# Patient Record
Sex: MALE | Race: WHITE | NOT HISPANIC OR LATINO | Employment: FULL TIME | URBAN - METROPOLITAN AREA
[De-identification: names, ages, dates, MRNs, and addresses within clinical notes are randomized per-mention and may not be internally consistent; named-entity substitution may affect disease eponyms.]

---

## 2017-03-05 ENCOUNTER — PATIENT MESSAGE (OUTPATIENT)
Dept: FAMILY MEDICINE | Facility: CLINIC | Age: 42
End: 2017-03-05

## 2017-03-05 DIAGNOSIS — G47.33 OSA (OBSTRUCTIVE SLEEP APNEA): Primary | ICD-10-CM

## 2017-04-04 ENCOUNTER — PATIENT MESSAGE (OUTPATIENT)
Dept: FAMILY MEDICINE | Facility: CLINIC | Age: 42
End: 2017-04-04

## 2017-04-20 ENCOUNTER — OFFICE VISIT (OUTPATIENT)
Dept: SLEEP MEDICINE | Facility: CLINIC | Age: 42
End: 2017-04-20
Payer: COMMERCIAL

## 2017-04-20 VITALS
HEART RATE: 80 BPM | BODY MASS INDEX: 34.7 KG/M2 | DIASTOLIC BLOOD PRESSURE: 64 MMHG | HEIGHT: 72 IN | SYSTOLIC BLOOD PRESSURE: 102 MMHG | WEIGHT: 256.19 LBS

## 2017-04-20 DIAGNOSIS — D50.9 IRON DEFICIENCY ANEMIA, UNSPECIFIED IRON DEFICIENCY ANEMIA TYPE: ICD-10-CM

## 2017-04-20 DIAGNOSIS — G47.30 UNSPECIFIED SLEEP APNEA: Primary | ICD-10-CM

## 2017-04-20 DIAGNOSIS — G25.81 RLS (RESTLESS LEGS SYNDROME): ICD-10-CM

## 2017-04-20 PROCEDURE — 1160F RVW MEDS BY RX/DR IN RCRD: CPT | Mod: S$GLB,,, | Performed by: NURSE PRACTITIONER

## 2017-04-20 PROCEDURE — 3074F SYST BP LT 130 MM HG: CPT | Mod: S$GLB,,, | Performed by: NURSE PRACTITIONER

## 2017-04-20 PROCEDURE — 99999 PR PBB SHADOW E&M-EST. PATIENT-LVL III: CPT | Mod: PBBFAC,,, | Performed by: NURSE PRACTITIONER

## 2017-04-20 PROCEDURE — 99204 OFFICE O/P NEW MOD 45 MIN: CPT | Mod: S$GLB,,, | Performed by: NURSE PRACTITIONER

## 2017-04-20 PROCEDURE — 3078F DIAST BP <80 MM HG: CPT | Mod: S$GLB,,, | Performed by: NURSE PRACTITIONER

## 2017-04-20 RX ORDER — ROPINIROLE 0.5 MG/1
.5-1 TABLET, FILM COATED ORAL NIGHTLY
Qty: 60 TABLET | Refills: 1 | Status: SHIPPED | OUTPATIENT
Start: 2017-04-20 | End: 2017-07-13 | Stop reason: SDUPTHER

## 2017-04-20 NOTE — PATIENT INSTRUCTIONS
Obstructive Sleep Apnea  Obstructive sleep apnea is a condition that causes your air passages to become narrowed or blocked during sleep. As a result, breathing stops for short periods. Your body wakes up enough for breathing to begin again, though you don't remember it. The cycle of stopped breathing and brief awakenings can repeat dozens of times a night. This prevents the body from getting to the deeper stages of sleep that are needed for good rest.  Signs of sleep apnea include loud snoring, noisy breathing, and gasping sounds during sleep. Daytime symptoms include waking up tired after a full night's sleep, waking up with headaches, feeling very sleepy or falling asleep during the day, and having problems with memory or concentration.  Risk factors for sleep apnea include:  · Being overweight  · Being a man, or a woman in menopause  · Smoking  · Using alcohol or sedating medications or herbs  · Having enlarged structures in the nose or throat  Home care  Lifestyle changes that can help treat snoring and sleep apnea include the following:  · If you are overweight, lose weight. Talk to your healthcare provider about a weight-loss plan for you.  · Avoid alcohol for 3 to 4 hours before bedtime. Avoid sedating medications. Ask your healthcare provider about the medications you take.  · If you smoke, talk to your healthcare provider about ways to quit.  · Sleep on your side. This can help prevent gravity from pulling relaxed throat tissues into your breathing passages.  · If you have allergies or sinus problems that block your nose, ask your healthcare provider for help.  Follow up  Follow up with your healthcare provider as advised. A diagnosis of sleep apnea is made with a sleep study. Your healthcare provider can tell you more about this test.  When to seek medical care  Sleep apnea can make you more likely to have certain health problems. These include high blood pressure, heart attack, stroke, and sexual  dysfunction. If you have sleep apnea, talk to your healthcare provider about the best treatments for you.  Date Last Reviewed: 5/3/2015  © 4151-0960 [employer]. [address] [place] [address], [place] [address]. All rights reserved. This information is not intended as a substitute for professional medical care. Always follow your healthcare professional's instructions.      Agatha or Khanh will contact you to schedule your sleep study. Their number is [phone] (ext 1). The [facility] is located on 7th floor of the [facility].    We will call you when the sleep study results are ready - if you have not heard from us by 2 weeks from the date of the study, please call [phone] (ext 2).    You are advised to abstain from driving should you feel sleepy or drowsy.    Understanding Restless Legs Syndrome  Are you ever annoyed by a creeping or itching feeling in your legs? Do you often feel an urge to move your legs while sitting or lying in bed? This can keep you from falling asleep at night. You may then feel tired during the day. If you have these problems, talk to your health care provider. He or she can suggest a treatment plan and help you find ways to sleep better.    Restless legs syndrome (RLS)  RLS is a creeping, crawly, or jumpy feeling in the legs with an urge to move them. Symptoms of RLS often occur during periods of inactivity, such as when you sit or lie down at night. This discomfort can keep you from falling asleep. RLS is more common in older people and tends to run in families. Overuse of caffeine or alcohol may make symptoms worse. Iron deficiency, diabetes, or kidney problems can contribute to RLS.  Periodic limb movement syndrome (PLMS)  PLMS is sudden, repetitive leg jerking during sleep. The person you sleep with is often the one who notices it. Your legs may jerk many times during the night. You and your partner may both have trouble sleeping and feel tired in the  morning. PLMS shouldnt be confused with the normal leg or body twitching many people have when first falling asleep.  Treating these problems  If these problems are causing disrupted sleep and daytime symptoms, treatment may be needed. Possible treatments may include:  · Avoiding medications like antidepressants, antinausea medications, and antipsychotic medications.   · Prescribed medications.  · Lifestyle changes, such as controlling caffeine intake, alcohol, and smoking.  Date Last Reviewed: 7/18/2015 © 2000-2016 [employer]. [address] [place] [address], [place] [address]. All rights reserved. This information is not intended as a substitute for professional medical care. Always follow your healthcare professional's instructions.        Understanding Restless Legs Syndrome  Are you ever annoyed by a creeping or itching feeling in your legs? Do you often feel an urge to move your legs while sitting or lying in bed? This can keep you from falling asleep at night. You may then feel tired during the day. If you have these problems, talk to your health care provider. He or she can suggest a treatment plan and help you find ways to sleep better.    Restless legs syndrome (RLS)  RLS is a creeping, crawly, or jumpy feeling in the legs with an urge to move them. Symptoms of RLS often occur during periods of inactivity, such as when you sit or lie down at night. This discomfort can keep you from falling asleep. RLS is more common in older people and tends to run in families. Overuse of caffeine or alcohol may make symptoms worse. Iron deficiency, diabetes, or kidney problems can contribute to RLS.  Periodic limb movement syndrome (PLMS)  PLMS is sudden, repetitive leg jerking during sleep. The person you sleep with is often the one who notices it. Your legs may jerk many times during the night. You and your partner may both have trouble sleeping and feel tired in the morning. PLMS shouldnt be confused with the  normal leg or body twitching many people have when first falling asleep.  Treating these problems  If these problems are causing disrupted sleep and daytime symptoms, treatment may be needed. Possible treatments may include:  · Avoiding medications like antidepressants, antinausea medications, and antipsychotic medications.   · Prescribed medications.  · Lifestyle changes, such as controlling caffeine intake, alcohol, and smoking.  Date Last Reviewed: 7/18/2015  © 3551-1020 [employer]. [address] [place] [address], [place] [address]. All rights reserved. This information is not intended as a substitute for professional medical care. Always follow your healthcare professional's instructions.

## 2017-04-20 NOTE — LETTER
April 20, 2017      Daysi Peoples MD  [address]  [place] [address]           [facility]  [address]  [facility] [address]  Phone: [phone]          Patient: Jaron Stein   MR Number: [id number]   YOB: 1975   Date of Visit: 4/20/2017       Dear Dr. Daysi Peoples:    Thank you for referring Jaron Stein to me for evaluation. Attached you will find relevant portions of my assessment and plan of care.    If you have questions, please do not hesitate to call me. I look forward to following Jaron Stein along with you.    Sincerely,    Carol Sotelo, NP    Enclosure  CC:  No Recipients    If you would like to receive this communication electronically, please contact [email or web address] or [phone] to request more information on [employer] access.    For providers and/or their staff who would like to refer a patient to [facility], please contact us through our one-stop-shop provider referral line, [facility], at [phone].    If you feel you have received this communication in error or would no longer like to receive these types of communications, please e-mail [email or web address]

## 2017-04-20 NOTE — PROGRESS NOTES
"Jaron Stein  was seen as a new patient, referred by Dr. Daysi Peoples , for the evaluation of obstructive sleep apnea.     CHIEF COMPLAINT: Snoring, excessive daytime sleepiness    HISTORY OF PRESENT ILLNESS: Jaron Stein a 42 y.o. male presents for the evaluation of obstructive sleep apnea. He has never had a sleep study.  He snores loudly, disurptive. "wife says its terrible". Sleeping separately. Wakes up tired. Don't feel good throughout the day. Can easily fall asleep when sedentary at work or when home in evening watching tv. +witnessed apneic pauses. Denies am headaches. Wakes up trying to catch his breath. Tries to sleep on his side. Irritable and butts due to poor sleep. Nocturia 0-1x.     Chronic allergies/claritin qd, allergy to some foods    When lies down has hard time falling asleep, knees ache, not painful, wants to keep moving legs or walk around house. Happens 6/7 nights, affecting sleep onset.     On todays [place] Sleepiness Scale the patient scores a 13/24.     Sleeps ~ 10p, WT 6a  Could be 1-3hr sleep onset    FAMILY HISTORY: No known sleep disorders.     SOCIAL HISTORY: . Social ETOH, no tobacco. Manager/desk work    REVIEW OF SYSTEMS:  Sleep related symptoms as per HPI; Positive overweight; Sinus congestion; Denies dyspnea; Denies palpitations; occasional acid reflux; Denies polyuria; Denies am headaches; + mood disturbance.  Otherwise, a balance of 10 systems reviewed is negative        PHYSICAL EXAM:   /64  Pulse 80  Ht 6' (1.829 m)  Wt 116.2 kg (256 lb 2.8 oz)  BMI 34.74 kg/m2  GENERAL: Obese body habitus, well groomed   HEENT: Conjunctivae are non-erythematous; Pupils equal, round, and reactive to light; Nose is symmetrical; Nasal mucosa is normal; Septum is midline; Inferior turbinates are normal; Nasal airflow is normal; Posterior pharynx is pink; Modified Mallampati: IV; Posterior palate is low; Tonsils 1+; Uvula is long/thick and pink;Tongue is high, broad " with prominent scalloped edges; Dentition is fair; No TMJ tenderness; Jaw opening and protrusion without click and without discomfort.   NECK: Supple. Neck circumference is 18 inches. No thyromegaly. No palpable nodes.   SKIN: On face and neck: No abrasions, no rashes, no lesions. No subcutaneous nodules are palpable.   RESPIRATORY: Chest is clear to auscultation. Normal chest expansion and non-labored breathing at rest.   CARDIOVASCULAR: Normal S1, S2. No murmurs, gallops or rubs. No carotid bruits bilaterally.   EXTREMITIES: No edema. No clubbing. No cyanosis. Station normal. Gait normal.   NEURO/PSYCH: Oriented to time, place and person. Normal attention span and concentration. Affect is full. Mood is normal.       ASSESSMENT:     Unspecified Sleep Apnea, with symptoms of disruptive snoring, witnessed apneic pauses, un-refreshing disrupted sleep and excessive daytime sleepiness, with exam findings of a crowded oral airway, with medical comorbidities of obesity, hypertension ,anxiety. Warrants further investigation for untreated sleep apnea.     RLS    PLAN:   1.  Home Sleep Study, discussed plan of care. Myochnser results  2. Discussed etiology of LISA and potential ramifications of untreated LISA, including stroke, heart disease, HTN.  We discussed potential treatment options, which could include weight loss (10-15%), body positioning, continuous positive airway pressure (CPAP-definitive), mandibular advancement splint by dentist, or referral for surgical consideration.   3. The patient was advised to abstain from driving should he feel sleepy or drowsy.   4. Consider breathe right strips, upright sleep, begin sinus flush qhs to improve nasal patency  5. Discussed etiology and CHUCKIE as treatable cause of RLS, and other reasons for periodic limb movts during sleep. Check ferritin- goal ferritin >50. Begin low dose Requip 0.25mg with up-titration q 3-5 nights, goal if needed 1mg for improvement of symptoms. Discussed  purpose and s/e    Thank you for allowing me the opportunity to participate in the care of your patient

## 2017-04-20 NOTE — MR AVS SNAPSHOT
[facility]  [address]  [facility] [address]  Phone: [phone]                  Jaron Stein   2017 10:20 AM   Office Visit    Description:  Male : 1975   Provider:  Carol Sotelo NP   Department:  [facility]           Reason for Visit     Snoring           Diagnoses this Visit        Comments    Unspecified sleep apnea    -  Primary     Iron deficiency anemia, unspecified iron deficiency anemia type         RLS (restless legs syndrome)                To Do List           Future Appointments        Provider Department Dept Phone    2017 7:00 AM TITA MARRERO [facility] [phone]      Goals (5 Years of Data)     None       These Medications        Disp Refills Start End    ropinirole (REQUIP) 0.5 MG tablet 60 tablet 1 2017    Take 1-2 tablets (0.5-1 mg total) by mouth every evening. - Oral    Pharmacy: [facility] [address] [place] [address] EXPY AT [facility] #: [phone]         [facility]     [facility] Nurse Care Line -  Assistance  Unless otherwise directed by your provider, please contact [facility], our nurse care line that is available for  assistance.     Registered nurses in the [facility] provide: appointment scheduling, clinical advisement, health education, and other advisory services.  Call: [phone] (toll free)               Medications           Message regarding Medications     Verify the changes and/or additions to your medication regime listed below are the same as discussed with your clinician today.  If any of these changes or additions are incorrect, please notify your healthcare provider.        START taking these NEW medications        Refills    ropinirole (REQUIP) 0.5 MG tablet 1    Sig: Take 1-2 tablets (0.5-1 mg total) by mouth every evening.    Class: Normal    Route: Oral            Verify that the below list of medications is an accurate representation of the medications you are currently taking.  If none reported, the list may be blank. If incorrect, please contact your healthcare provider. Carry this list with you in case of emergency.           Current Medications     nadolol (CORGARD) 40 MG tablet Take 1 tablet by mouth once daily    paroxetine (PAXIL) 10 MG tablet Take 1 tablet by mouth  every morning    ropinirole (REQUIP) 0.5 MG tablet Take 1-2 tablets (0.5-1 mg total) by mouth every evening.           Clinical Reference Information           Your Vitals Were     BP Pulse Height Weight BMI    102/64 80 6' (1.829 m) 116.2 kg (256 lb 2.8 oz) 34.74 kg/m2      Blood Pressure          Most Recent Value    BP  102/64      Allergies as of 4/20/2017     No Known Allergies      Immunizations Administered on Date of Encounter - 4/20/2017     None      Orders Placed During Today's Visit     Future Labs/Procedures Expected by Expires    Ferritin  4/20/2017 6/19/2018    Home Sleep Studies  As directed 4/20/2018      Instructions      Obstructive Sleep Apnea  Obstructive sleep apnea is a condition that causes your air passages to become narrowed or blocked during sleep. As a result, breathing stops for short periods. Your body wakes up enough for breathing to begin again, though you don't remember it. The cycle of stopped breathing and brief awakenings can repeat dozens of times a night. This prevents the body from getting to the deeper stages of sleep that are needed for good rest.  Signs of sleep apnea include loud snoring, noisy breathing, and gasping sounds during sleep. Daytime symptoms include waking up tired after a full night's sleep, waking up with headaches, feeling very sleepy or falling asleep during the day, and having problems with memory or concentration.  Risk factors for sleep apnea include:  · Being overweight  · Being a man, or a woman in menopause  · Smoking  · Using alcohol or  sedating medications or herbs  · Having enlarged structures in the nose or throat  Home care  Lifestyle changes that can help treat snoring and sleep apnea include the following:  · If you are overweight, lose weight. Talk to your healthcare provider about a weight-loss plan for you.  · Avoid alcohol for 3 to 4 hours before bedtime. Avoid sedating medications. Ask your healthcare provider about the medications you take.  · If you smoke, talk to your healthcare provider about ways to quit.  · Sleep on your side. This can help prevent gravity from pulling relaxed throat tissues into your breathing passages.  · If you have allergies or sinus problems that block your nose, ask your healthcare provider for help.  Follow up  Follow up with your healthcare provider as advised. A diagnosis of sleep apnea is made with a sleep study. Your healthcare provider can tell you more about this test.  When to seek medical care  Sleep apnea can make you more likely to have certain health problems. These include high blood pressure, heart attack, stroke, and sexual dysfunction. If you have sleep apnea, talk to your healthcare provider about the best treatments for you.  Date Last Reviewed: 5/3/2015  © 5087-8605 [employer]. [address] [place] [address]. All rights reserved. This information is not intended as a substitute for professional medical care. Always follow your healthcare professional's instructions.      Agatha or Khanh will contact you to schedule your sleep study. Their number is [phone] (ext 1). The [facility] is located on 7th floor of the [facility].    We will call you when the sleep study results are ready - if you have not heard from us by 2 weeks from the date of the study, please call [phone] (ext 2).    You are advised to abstain from driving should you feel sleepy or drowsy.    Understanding Restless Legs Syndrome  Are you ever annoyed by a creeping or itching  feeling in your legs? Do you often feel an urge to move your legs while sitting or lying in bed? This can keep you from falling asleep at night. You may then feel tired during the day. If you have these problems, talk to your health care provider. He or she can suggest a treatment plan and help you find ways to sleep better.    Restless legs syndrome (RLS)  RLS is a creeping, crawly, or jumpy feeling in the legs with an urge to move them. Symptoms of RLS often occur during periods of inactivity, such as when you sit or lie down at night. This discomfort can keep you from falling asleep. RLS is more common in older people and tends to run in families. Overuse of caffeine or alcohol may make symptoms worse. Iron deficiency, diabetes, or kidney problems can contribute to RLS.  Periodic limb movement syndrome (PLMS)  PLMS is sudden, repetitive leg jerking during sleep. The person you sleep with is often the one who notices it. Your legs may jerk many times during the night. You and your partner may both have trouble sleeping and feel tired in the morning. PLMS shouldnt be confused with the normal leg or body twitching many people have when first falling asleep.  Treating these problems  If these problems are causing disrupted sleep and daytime symptoms, treatment may be needed. Possible treatments may include:  · Avoiding medications like antidepressants, antinausea medications, and antipsychotic medications.   · Prescribed medications.  · Lifestyle changes, such as controlling caffeine intake, alcohol, and smoking.  Date Last Reviewed: 7/18/2015  © 1988-3766 [employer]. [address] [place] [address], [place] [address]. All rights reserved. This information is not intended as a substitute for professional medical care. Always follow your healthcare professional's instructions.        Understanding Restless Legs Syndrome  Are you ever annoyed by a creeping or itching feeling in your legs? Do you often feel an  urge to move your legs while sitting or lying in bed? This can keep you from falling asleep at night. You may then feel tired during the day. If you have these problems, talk to your health care provider. He or she can suggest a treatment plan and help you find ways to sleep better.    Restless legs syndrome (RLS)  RLS is a creeping, crawly, or jumpy feeling in the legs with an urge to move them. Symptoms of RLS often occur during periods of inactivity, such as when you sit or lie down at night. This discomfort can keep you from falling asleep. RLS is more common in older people and tends to run in families. Overuse of caffeine or alcohol may make symptoms worse. Iron deficiency, diabetes, or kidney problems can contribute to RLS.  Periodic limb movement syndrome (PLMS)  PLMS is sudden, repetitive leg jerking during sleep. The person you sleep with is often the one who notices it. Your legs may jerk many times during the night. You and your partner may both have trouble sleeping and feel tired in the morning. PLMS shouldnt be confused with the normal leg or body twitching many people have when first falling asleep.  Treating these problems  If these problems are causing disrupted sleep and daytime symptoms, treatment may be needed. Possible treatments may include:  · Avoiding medications like antidepressants, antinausea medications, and antipsychotic medications.   · Prescribed medications.  · Lifestyle changes, such as controlling caffeine intake, alcohol, and smoking.  Date Last Reviewed: 7/18/2015 © 2000-2016 [employer]. [address] [place] [address], [place] [address]. All rights reserved. This information is not intended as a substitute for professional medical care. Always follow your healthcare professional's instructions.             Language Assistance Services     ATTENTION: Language assistance services are available, free of charge. Please call [phone].      ATENCIÓN: Misty purvis  tiene a akhtar disposición servicios gratuitos de asistencia lingüística. Llneno al [phone].     RANJIT Ý: N?u b?n nói Ti?ng Vi?t, có các d?ch v? h? tr? ngôn ng? mi?n phí dành cho b?n. G?i s? [phone].         [facility] complies with applicable Federal civil rights laws and does not discriminate on the basis of race, color, national origin, age, disability, or sex.

## 2017-04-21 ENCOUNTER — LAB VISIT (OUTPATIENT)
Dept: LAB | Facility: HOSPITAL | Age: 42
End: 2017-04-21
Attending: NURSE PRACTITIONER
Payer: COMMERCIAL

## 2017-04-21 DIAGNOSIS — D50.9 IRON DEFICIENCY ANEMIA, UNSPECIFIED IRON DEFICIENCY ANEMIA TYPE: ICD-10-CM

## 2017-04-21 LAB — FERRITIN SERPL-MCNC: 160 NG/ML

## 2017-04-21 PROCEDURE — 36415 COLL VENOUS BLD VENIPUNCTURE: CPT | Mod: PO

## 2017-04-21 PROCEDURE — 82728 ASSAY OF FERRITIN: CPT

## 2017-05-01 ENCOUNTER — TELEPHONE (OUTPATIENT)
Dept: SLEEP MEDICINE | Facility: OTHER | Age: 42
End: 2017-05-01

## 2017-05-04 ENCOUNTER — TELEPHONE (OUTPATIENT)
Dept: SLEEP MEDICINE | Facility: OTHER | Age: 42
End: 2017-05-04

## 2017-05-15 ENCOUNTER — LAB VISIT (OUTPATIENT)
Dept: LAB | Facility: HOSPITAL | Age: 42
End: 2017-05-15
Attending: NURSE PRACTITIONER
Payer: COMMERCIAL

## 2017-05-15 ENCOUNTER — OFFICE VISIT (OUTPATIENT)
Dept: FAMILY MEDICINE | Facility: CLINIC | Age: 42
End: 2017-05-15
Payer: COMMERCIAL

## 2017-05-15 VITALS
OXYGEN SATURATION: 98 % | HEART RATE: 76 BPM | BODY MASS INDEX: 34.78 KG/M2 | SYSTOLIC BLOOD PRESSURE: 114 MMHG | HEIGHT: 72 IN | WEIGHT: 256.81 LBS | TEMPERATURE: 99 F | DIASTOLIC BLOOD PRESSURE: 78 MMHG

## 2017-05-15 DIAGNOSIS — Z00.00 ROUTINE MEDICAL EXAM: ICD-10-CM

## 2017-05-15 DIAGNOSIS — R09.82 PND (POST-NASAL DRIP): ICD-10-CM

## 2017-05-15 DIAGNOSIS — Z83.3 FAMILY HISTORY OF DIABETES MELLITUS IN MOTHER: ICD-10-CM

## 2017-05-15 DIAGNOSIS — Z00.00 ROUTINE MEDICAL EXAM: Primary | ICD-10-CM

## 2017-05-15 DIAGNOSIS — I10 ESSENTIAL HYPERTENSION: ICD-10-CM

## 2017-05-15 LAB
ALBUMIN SERPL BCP-MCNC: 4.1 G/DL
ALP SERPL-CCNC: 51 U/L
ALT SERPL W/O P-5'-P-CCNC: 45 U/L
ANION GAP SERPL CALC-SCNC: 14 MMOL/L
AST SERPL-CCNC: 20 U/L
BILIRUB SERPL-MCNC: 0.6 MG/DL
BUN SERPL-MCNC: 16 MG/DL
CALCIUM SERPL-MCNC: 9.5 MG/DL
CHLORIDE SERPL-SCNC: 107 MMOL/L
CHOLEST/HDLC SERPL: 6.3 {RATIO}
CO2 SERPL-SCNC: 19 MMOL/L
CREAT SERPL-MCNC: 1.1 MG/DL
EST. GFR  (AFRICAN AMERICAN): >60 ML/MIN/1.73 M^2
EST. GFR  (NON AFRICAN AMERICAN): >60 ML/MIN/1.73 M^2
GLUCOSE SERPL-MCNC: 96 MG/DL
HDL/CHOLESTEROL RATIO: 16 %
HDLC SERPL-MCNC: 225 MG/DL
HDLC SERPL-MCNC: 36 MG/DL
LDLC SERPL CALC-MCNC: 150.6 MG/DL
NONHDLC SERPL-MCNC: 189 MG/DL
POTASSIUM SERPL-SCNC: 5 MMOL/L
PROT SERPL-MCNC: 7.4 G/DL
SODIUM SERPL-SCNC: 140 MMOL/L
TRIGL SERPL-MCNC: 192 MG/DL
TSH SERPL DL<=0.005 MIU/L-ACNC: 0.84 UIU/ML

## 2017-05-15 PROCEDURE — 80061 LIPID PANEL: CPT

## 2017-05-15 PROCEDURE — 84443 ASSAY THYROID STIM HORMONE: CPT

## 2017-05-15 PROCEDURE — 99396 PREV VISIT EST AGE 40-64: CPT | Mod: S$GLB,,, | Performed by: NURSE PRACTITIONER

## 2017-05-15 PROCEDURE — 1160F RVW MEDS BY RX/DR IN RCRD: CPT | Mod: S$GLB,,, | Performed by: NURSE PRACTITIONER

## 2017-05-15 PROCEDURE — 3078F DIAST BP <80 MM HG: CPT | Mod: S$GLB,,, | Performed by: NURSE PRACTITIONER

## 2017-05-15 PROCEDURE — 83036 HEMOGLOBIN GLYCOSYLATED A1C: CPT

## 2017-05-15 PROCEDURE — 99999 PR PBB SHADOW E&M-EST. PATIENT-LVL III: CPT | Mod: PBBFAC,,, | Performed by: NURSE PRACTITIONER

## 2017-05-15 PROCEDURE — 3074F SYST BP LT 130 MM HG: CPT | Mod: S$GLB,,, | Performed by: NURSE PRACTITIONER

## 2017-05-15 PROCEDURE — 36415 COLL VENOUS BLD VENIPUNCTURE: CPT | Mod: PO

## 2017-05-15 PROCEDURE — 99213 OFFICE O/P EST LOW 20 MIN: CPT | Mod: 25,S$GLB,, | Performed by: NURSE PRACTITIONER

## 2017-05-15 PROCEDURE — 80053 COMPREHEN METABOLIC PANEL: CPT

## 2017-05-15 NOTE — PROGRESS NOTES
This dictation has been generated using Dragon Dictation some phonetic errors may occur.     Jaron was seen today for sore throat.    Diagnoses and all orders for this visit:    Routine medical exam  -     Comprehensive metabolic panel; Future  -     Hemoglobin A1c; Future  -     Lipid panel; Future  -     TSH; Future    PND (post-nasal drip)    Essential hypertension    Family history of diabetes mellitus in mother  -     Hemoglobin A1c; Future      Physical exam. Update labs. I will review and address accordingly.   PND. Claritin(loratadine), Allegra(fexofenadine), or zyrtec(cetirizine) for runny nose and congestion.   HTN stable and controlled continue current therapies.       No Follow-up on file.      ________________________________________________________________  ________________________________________________________________        Chief Complaint   Patient presents with    Sore Throat     History of present illness  This 42 y.o. presents today for complaint of physical, PND, and HTN follow up.  Pt made apt for sore throat but is due for physical and follow up on Blood pressure. He remains active. Taking BP med as directed without side effects.  Health maintenance up to date.   ROS:   CONST: weight stable.  EYES: no vision change.  ENT: Sore throat and throat clearing.  No earache.  CV: no chest pain w/ exertion.  RESP: No shortness of breath.  GI: no nausea, vomiting, diarrhea. No dysphagia. Appetite good.   : no urinary issues.  MUSCULOSKELETAL: no new myalgias or arthralgias.  SKIN: no new changes.  NEURO: no focal deficits. Denies headache.  PSYCH: no new issues.  ENDOCRINE: no polyuria.  HEME: no lymph nodes.  ALLERGY: no general pruritis.      Reviewed histories.     Past Medical History:   Diagnosis Date    Anxiety     Headache, migraine     Hypertension     Obesity        Past Surgical History:   Procedure Laterality Date    NO PAST SURGERIES         Family History   Problem Relation Age of  Onset    Diabetes Mother        Social History     Social History    Marital status:      Spouse name: N/A    Number of children: N/A    Years of education: N/A     Social History Main Topics    Smoking status: Former Smoker    Smokeless tobacco: Never Used      Comment: quit 6-7 years ago    Alcohol use Yes      Comment: socially    Drug use: No    Sexual activity: Yes     Partners: Female     Other Topics Concern    None     Social History Narrative       Current Outpatient Prescriptions   Medication Sig Dispense Refill    nadolol (CORGARD) 40 MG tablet Take 1 tablet by mouth once daily 90 tablet 3    paroxetine (PAXIL) 10 MG tablet Take 1 tablet by mouth  every morning 90 tablet 3    ropinirole (REQUIP) 0.5 MG tablet Take 1-2 tablets (0.5-1 mg total) by mouth every evening. 60 tablet 1     No current facility-administered medications for this visit.        Review of patient's allergies indicates:  No Known Allergies    Physical examination  Vitals Reviewed  Gen. Well-dressed well-nourished no apparent distress  Skin warm dry and intact.  No rashes noted.  HEENT.  TM intact bilateral with normal light reflex.  No mastoid tenderness during percussion.  Nares patent bilateral.  Pharynx is unremarkable except PND.  No maxillary or frontal sinus tenderness when percussed.    Neck is supple without adenopathy  Chest.  Respirations are even unlabored.  Lungs are clear to auscultation.  Cardiac regular rate and rhythm.  No chest wall adenopathy noted.  Neuro. Awake alert oriented x4.  Normal judgment and cognition noted.  Extremities no clubbing cyanosis or edema noted.     Call or return to clinic prn if these symptoms worsen or fail to improve as anticipated.      _____________________________________________________________________________________________________________________________________________    Chief complaint  Blood pressure  Sore throat    HPI  Pt has had a sore throat for 2 weeks.  He  notes throat clearing. Symptoms are worse in the AM. Eating or drinking sooths throat.  No med for symptoms.  Rates pain as a 4 on a 1/10 scale.   ROS:   CONST: weight stable. No fever or chills.  EYES: no vision change.  ENT: No sore throat, headache, or earache.  CV: no chest pain w/ exertion.  RESP: No shortness of breath.  No coughing.  GI: no nausea, vomiting, diarrhea. No dysphagia. No GERD.   : no urinary issues.  MUSCULOSKELETAL: no new myalgias or arthralgias.  SKIN: no new changes. No rash.  NEURO: no focal deficits. Denies headache.  PSYCH: no new issues.  ENDOCRINE: no polyuria.  HEME: no lymph nodes.  ALLERGY: no general pruritis.      Reviewed histories.    Physical exam as above.    PND (post-nasal drip)    Essential hypertension    Family history of diabetes mellitus in mother  -     Hemoglobin A1c; Future       Claritin(loratadine), Allegra(fexofenadine), or zyrtec(cetirizine) for runny nose and congestion.     Continue HTN meds. No changes.

## 2017-05-15 NOTE — MR AVS SNAPSHOT
[facility]  [address] [place]  [place] [address]  Phone: [phone]  Fax: [phone]                  Jaron Stein   5/15/2017 8:20 AM   Office Visit    Description:  Male : 1975   Provider:  Moris Blackwell NP   Department:  [facility]           Reason for Visit     Sore Throat           Diagnoses this Visit        Comments    Routine medical exam    -  Primary     PND (post-nasal drip)         Essential hypertension         Family history of diabetes mellitus in mother                To Do List           Future Appointments        Provider Department Dept Phone    5/15/2017 8:20 AM Moris Blackwell NP [facility] [phone]    2017 1:00 PM HOME STUDY, SLEEP [facility] [phone]      Goals (5 Years of Data)     None      [facility] On Call     [facility] Nurse Care Line - 24/ Assistance  Unless otherwise directed by your provider, please contact [facility], our nurse care line that is available for  assistance.     Registered nurses in the [facility] Center provide: appointment scheduling, clinical advisement, health education, and other advisory services.  Call: [phone] (toll free)               Medications           Message regarding Medications     Verify the changes and/or additions to your medication regime listed below are the same as discussed with your clinician today.  If any of these changes or additions are incorrect, please notify your healthcare provider.             Verify that the below list of medications is an accurate representation of the medications you are currently taking.  If none reported, the list may be blank. If incorrect, please contact your healthcare provider. Carry this list with you in case of emergency.           Current Medications     nadolol (CORGARD) 40 MG tablet Take 1 tablet by mouth once daily    paroxetine (PAXIL) 10 MG tablet Take 1 tablet by mouth   every morning    ropinirole (REQUIP) 0.5 MG tablet Take 1-2 tablets (0.5-1 mg total) by mouth every evening.           Clinical Reference Information           Your Vitals Were     BP Pulse Temp Height Weight SpO2    114/78 (BP Location: Right arm, Patient Position: Sitting, BP Method: Manual) 76 98.8 °F (37.1 °C) 6' (1.829 m) 116.5 kg (256 lb 13.4 oz) 98%    BMI                34.83 kg/m2          Blood Pressure          Most Recent Value    BP  114/78      Allergies as of 5/15/2017     No Known Allergies      Immunizations Administered on Date of Encounter - 5/15/2017     None      Orders Placed During Today's Visit     Future Labs/Procedures Expected by Expires    Comprehensive metabolic panel  5/15/2017 8/13/2017    Lipid panel  5/15/2017 8/13/2017    TSH  5/15/2017 8/13/2017    Hemoglobin A1c  As directed 5/15/2018      Instructions    Claritin(loratadine), Allegra(fexofenadine), or zyrtec(cetirizine) for runny nose and congestion.   Delsym for cough.          Language Assistance Services     ATTENTION: Language assistance services are available, free of charge. Please call [phone].      ATENCIÓN: Si cesar purvis, tiene a akhtar disposición servicios gratuitos de asistencia lingüística. Llame al [phone].     RANJIT Ý: N?u b?n nói Ti?ng Vi?t, có các d?ch v? h? tr? ngôn ng? mi?n phí dành cho b?n. G?i s? [phone].         [facility] complies with applicable Federal civil rights laws and does not discriminate on the basis of race, color, national origin, age, disability, or sex.

## 2017-05-16 LAB
ESTIMATED AVG GLUCOSE: 105 MG/DL
HBA1C MFR BLD HPLC: 5.3 %

## 2017-06-05 ENCOUNTER — TELEPHONE (OUTPATIENT)
Dept: SLEEP MEDICINE | Facility: OTHER | Age: 42
End: 2017-06-05

## 2017-06-06 ENCOUNTER — HOSPITAL ENCOUNTER (OUTPATIENT)
Dept: SLEEP MEDICINE | Facility: OTHER | Age: 42
Discharge: HOME | End: 2017-06-06
Attending: NURSE PRACTITIONER
Payer: COMMERCIAL

## 2017-06-06 DIAGNOSIS — G47.33 OSA (OBSTRUCTIVE SLEEP APNEA): ICD-10-CM

## 2017-06-06 DIAGNOSIS — G47.30 UNSPECIFIED SLEEP APNEA: ICD-10-CM

## 2017-06-06 PROCEDURE — 95800 SLP STDY UNATTENDED: CPT

## 2017-06-06 PROCEDURE — 95800 SLP STDY UNATTENDED: CPT | Mod: 26,,, | Performed by: PSYCHIATRY & NEUROLOGY

## 2017-06-11 DIAGNOSIS — I10 ESSENTIAL HYPERTENSION: ICD-10-CM

## 2017-06-11 DIAGNOSIS — F41.9 ANXIETY: ICD-10-CM

## 2017-06-12 ENCOUNTER — PATIENT MESSAGE (OUTPATIENT)
Dept: FAMILY MEDICINE | Facility: CLINIC | Age: 42
End: 2017-06-12

## 2017-06-12 DIAGNOSIS — Z30.09 VASECTOMY EVALUATION: Primary | ICD-10-CM

## 2017-06-12 RX ORDER — NADOLOL 40 MG/1
TABLET ORAL
Qty: 90 TABLET | OUTPATIENT
Start: 2017-06-12

## 2017-06-12 RX ORDER — PAROXETINE 10 MG/1
TABLET, FILM COATED ORAL
Qty: 90 TABLET | OUTPATIENT
Start: 2017-06-12

## 2017-06-18 ENCOUNTER — PATIENT MESSAGE (OUTPATIENT)
Dept: SLEEP MEDICINE | Facility: CLINIC | Age: 42
End: 2017-06-18

## 2017-06-30 ENCOUNTER — PATIENT MESSAGE (OUTPATIENT)
Dept: FAMILY MEDICINE | Facility: CLINIC | Age: 42
End: 2017-06-30

## 2017-06-30 DIAGNOSIS — R09.81 SINUS CONGESTION: Primary | ICD-10-CM

## 2017-06-30 DIAGNOSIS — G47.33 OBSTRUCTIVE SLEEP APNEA: Primary | ICD-10-CM

## 2017-07-12 ENCOUNTER — PATIENT MESSAGE (OUTPATIENT)
Dept: FAMILY MEDICINE | Facility: CLINIC | Age: 42
End: 2017-07-12

## 2017-07-13 DIAGNOSIS — G25.81 RLS (RESTLESS LEGS SYNDROME): ICD-10-CM

## 2017-07-13 RX ORDER — ROPINIROLE 0.5 MG/1
TABLET, FILM COATED ORAL
Qty: 60 TABLET | Refills: 5 | Status: SHIPPED | OUTPATIENT
Start: 2017-07-13 | End: 2019-01-12

## 2017-07-13 NOTE — TELEPHONE ENCOUNTER
Dr Peoples put this referral in, I do not schedule her referrals. [place] does, but I called the patient and he is scheduled.

## 2017-07-21 ENCOUNTER — OFFICE VISIT (OUTPATIENT)
Dept: UROLOGY | Facility: CLINIC | Age: 42
End: 2017-07-21
Payer: COMMERCIAL

## 2017-07-21 VITALS
WEIGHT: 264.13 LBS | SYSTOLIC BLOOD PRESSURE: 118 MMHG | DIASTOLIC BLOOD PRESSURE: 78 MMHG | BODY MASS INDEX: 35.78 KG/M2 | HEART RATE: 60 BPM | HEIGHT: 72 IN

## 2017-07-21 DIAGNOSIS — Z30.09 STERILIZATION CONSULT: Primary | ICD-10-CM

## 2017-07-21 LAB
BILIRUB SERPL-MCNC: NORMAL MG/DL
BLOOD URINE, POC: NORMAL
COLOR, POC UA: YELLOW
GLUCOSE UR QL STRIP: NORMAL
KETONES UR QL STRIP: NORMAL
LEUKOCYTE ESTERASE URINE, POC: NORMAL
NITRITE, POC UA: NORMAL
PH, POC UA: 5
PROTEIN, POC: NORMAL
SPECIFIC GRAVITY, POC UA: 1030
UROBILINOGEN, POC UA: NORMAL

## 2017-07-21 PROCEDURE — 99243 OFF/OP CNSLTJ NEW/EST LOW 30: CPT | Mod: 25,S$GLB,, | Performed by: UROLOGY

## 2017-07-21 PROCEDURE — 81001 URINALYSIS AUTO W/SCOPE: CPT | Mod: S$GLB,,, | Performed by: UROLOGY

## 2017-07-21 PROCEDURE — 99999 PR PBB SHADOW E&M-EST. PATIENT-LVL III: CPT | Mod: PBBFAC,,, | Performed by: UROLOGY

## 2017-07-21 RX ORDER — DIAZEPAM 10 MG/1
10 TABLET ORAL ONCE AS NEEDED
Qty: 1 TABLET | Refills: 0 | Status: SHIPPED | OUTPATIENT
Start: 2017-07-21 | End: 2017-09-01

## 2017-07-21 NOTE — LETTER
July 24, 2017      Daysi Peoples MD  [address], [place] [address]           [facility]  [address] [facility] [address]  [facility] [address]  Phone: [phone]          Patient: Jaron Stein   MR Number: [id number]   YOB: 1975   Date of Visit: 7/21/2017       Dear Dr. Daysi Peoples:    Thank you for referring Jaron Stein to me for evaluation. Attached you will find relevant portions of my assessment and plan of care.    If you have questions, please do not hesitate to call me. I look forward to following Jaron Stein along with you.    Sincerely,        Enclosure  CC:  No Recipients    If you would like to receive this communication electronically, please contact [email or web address] or [phone] to request more information on [email or web address] Link access.    For providers and/or their staff who would like to refer a patient to [facility], please contact us through our one-stop-shop provider referral line, Johnie Carey, at [phone].    If you feel you have received this communication in error or would no longer like to receive these types of communications, please e-mail [email or web address]

## 2017-07-21 NOTE — PROGRESS NOTES
Subjective:       Patient ID: Jaron Stein is a 42 y.o. male who was referred by Daysi Peoples MD    Chief Complaint:   Chief Complaint   Patient presents with    Sterilization     consult for sterilzation        Vasectomy Consult  Patient here for pre-vasectomy consultation. He denies hematuria, urinary tract infections, urolithiasis, prostatitis, erectile dysfunction, previous genitourinary surgery, epididymal orchitis, testicular masses, scrotal trauma, sexually transmitted diseases, family history of prostate cancer. He was given the consent form, pre-vasectomy instruction sheet, and vasectomy booklet. I extensively reviewed with him the likely postoperative recuperative period as well as the need to continue to use contraception until he is notified by us of his sterility. He will have a semen analysis after 25 ejaculations. He understands the potential side effects of anesthesia, bleeding, scrotal hematoma, wound infection, epididymal orchitis, epididymal congestion, chronic testicular pain requiring further surgery, sperm granuloma, antisperm antibodies, early recanalization, spontaneous recanalization with pregnancy after demonstration of azoospermia and the possible association with prostate cancer. He is aware of alternatives to vasectomy. He has given this careful consideration and wishes to proceed with a vasectomy.   He has three children.        ACTIVE MEDICAL ISSUES:  Patient Active Problem List   Diagnosis    Hypertension    Obesity    Unspecified sleep apnea    LISA (obstructive sleep apnea)       PAST MEDICAL HISTORY  Past Medical History:   Diagnosis Date    Anxiety     Headache, migraine     Hypertension     Obesity        PAST SURGICAL HISTORY:  Past Surgical History:   Procedure Laterality Date    NO PAST SURGERIES         SOCIAL HISTORY:  Social History   Substance Use Topics    Smoking status: Former Smoker    Smokeless tobacco: Never Used      Comment: quit 6-7 years ago     Alcohol use Yes      Comment: socially       FAMILY HISTORY:  Family History   Problem Relation Age of Onset    Diabetes Mother        ALLERGIES AND MEDICATIONS: updated and reviewed.  Review of patient's allergies indicates:  No Known Allergies  Current Outpatient Prescriptions   Medication Sig    nadolol (CORGARD) 40 MG tablet Take 1 tablet by mouth once daily    paroxetine (PAXIL) 10 MG tablet Take 1 tablet by mouth  every morning    ropinirole (REQUIP) 0.5 MG tablet TAKE 1 TO 2 TABLETS(0.5 TO 1 MG) BY MOUTH EVERY EVENING    diazePAM (VALIUM) 10 MG Tab Take 1 tablet (10 mg total) by mouth once as needed.     No current facility-administered medications for this visit.        Review of Systems   Constitutional: Negative for activity change, fatigue, fever and unexpected weight change.   HENT: Negative for congestion.    Eyes: Negative for redness.   Respiratory: Negative for chest tightness and shortness of breath.    Cardiovascular: Negative for chest pain and leg swelling.   Gastrointestinal: Negative for abdominal pain, constipation, diarrhea, nausea and vomiting.   Genitourinary: Negative for dysuria, flank pain, frequency, hematuria, penile pain, penile swelling, scrotal swelling, testicular pain and urgency.   Musculoskeletal: Negative for arthralgias and back pain.   Neurological: Negative for dizziness and light-headedness.   Psychiatric/Behavioral: Negative for behavioral problems and confusion. The patient is not nervous/anxious.    All other systems reviewed and are negative.      Objective:      Vitals:    07/21/17 1349   BP: 118/78   Pulse: 60   Weight: 119.8 kg (264 lb 1.8 oz)   Height: 6' (1.829 m)     Physical Exam   Nursing note and vitals reviewed.  Constitutional: He is oriented to person, place, and time. He appears well-developed and well-nourished.   HENT:   Head: Normocephalic.   Eyes: Conjunctivae are normal.   Neck: Normal range of motion. Neck supple. No tracheal deviation  present. No thyromegaly present.   Cardiovascular: Normal rate and normal heart sounds.    Pulmonary/Chest: Effort normal and breath sounds normal. No respiratory distress. He has no wheezes.   Abdominal: Soft. Bowel sounds are normal. There is no hepatosplenomegaly. There is no tenderness. There is no rebound and no CVA tenderness. No hernia.   Genitourinary: Penis normal. Right testis shows no mass and no tenderness. Left testis shows no mass and no tenderness. Circumcised.   Musculoskeletal: Normal range of motion. He exhibits no edema or tenderness.   Lymphadenopathy:     He has no cervical adenopathy.   Neurological: He is alert and oriented to person, place, and time.   Skin: Skin is warm and dry. No rash noted. No erythema.     Psychiatric: He has a normal mood and affect. His behavior is normal. Judgment and thought content normal.       Urine dipstick shows negative for all components.  Micro exam: negative for WBC's or RBC's.    Assessment:       1. Sterilization consult          Plan:       1. Sterilization consult    - POCT urinalysis, dipstick or tablet reag  - diazePAM (VALIUM) 10 MG Tab; Take 1 tablet (10 mg total) by mouth once as needed.  Dispense: 1 tablet; Refill: 0  - Vasectomy; Future            Return for vasectomy.

## 2017-08-25 ENCOUNTER — PROCEDURE VISIT (OUTPATIENT)
Dept: UROLOGY | Facility: CLINIC | Age: 42
End: 2017-08-25
Payer: COMMERCIAL

## 2017-08-25 VITALS
RESPIRATION RATE: 16 BRPM | BODY MASS INDEX: 35.76 KG/M2 | SYSTOLIC BLOOD PRESSURE: 130 MMHG | DIASTOLIC BLOOD PRESSURE: 70 MMHG | HEIGHT: 72 IN | TEMPERATURE: 99 F | WEIGHT: 264 LBS | HEART RATE: 62 BPM

## 2017-08-25 DIAGNOSIS — Z30.09 STERILIZATION CONSULT: Primary | ICD-10-CM

## 2017-08-25 PROCEDURE — 88302 TISSUE EXAM BY PATHOLOGIST: CPT | Mod: 26,,, | Performed by: PATHOLOGY

## 2017-08-25 PROCEDURE — 88302 TISSUE EXAM BY PATHOLOGIST: CPT | Performed by: PATHOLOGY

## 2017-08-25 PROCEDURE — 55250 REMOVAL OF SPERM DUCT(S): CPT | Mod: S$GLB,,, | Performed by: UROLOGY

## 2017-08-25 RX ORDER — CEPHALEXIN 500 MG/1
500 CAPSULE ORAL EVERY 8 HOURS
Qty: 15 CAPSULE | Refills: 0 | Status: SHIPPED | OUTPATIENT
Start: 2017-08-25 | End: 2017-09-01

## 2017-08-25 RX ORDER — HYDROCODONE BITARTRATE AND ACETAMINOPHEN 5; 325 MG/1; MG/1
1 TABLET ORAL EVERY 6 HOURS PRN
Qty: 30 TABLET | Refills: 0 | Status: SHIPPED | OUTPATIENT
Start: 2017-08-25 | End: 2017-09-01

## 2017-08-25 NOTE — PROCEDURES
"Vasectomy  Date/Time: 8/25/2017 4:47 PM  Performed by: FEROZ ARZOLA  Authorized by: FEROZ ARZOLA     Consent Done?:  Yes (Written)  Time out: Immediately prior to procedure a "time out" was called to verify the correct patient, procedure, equipment, support staff and site/side marked as required.    Indications:  [place] male  Position:  Other (supine)  Anesthesia:  10 cc 2% Lidocaine  Patient sedated: No    Preparation: Patient was prepped and draped in usual sterile fashion    Incisions:  1  Length vas excised:  2.5 cm  Vas:  Fulgurated and Clipped  Skin closures:  3-0 chromic SH  Same procedure performed on both sides    Patient tolerance:  Patient tolerated the procedure well with no immediate complications      "

## 2017-08-25 NOTE — PATIENT INSTRUCTIONS
What to Expect After a Vasectomy  You cannot drive or operate heavy machinery on the day of the procedure. Begin prescription antibiotics today and take as directed until finished. Dr. Drew will give you a prescription for a narcotic pain medication. You may choose to take the prescription medication or Tylenol only for minor discomfort. Do not take any NSAIDS (e.g., Motrin, Naprosyn, etc.) or aspirin for at least one week, as these products can thin the blood.    Apply ice packs to the scrotal area for 24-48 hours. Avoid direct contact of the ice pack with the skin. Scrotal supports, jock straps, or fitted underwear help elevate the scrotum and reduce discomfort.    You may shower the next day. Gently apply soapy water to the scrotum to wash. Rinse and dry yourself by blotting the skin, not rubbing.    Avoid strenuous physical exercises or sexual relations for at least one week after a vasectomy.    Continue to use birth control for at least 6-8 weeks or 20 ejaculations. You are still considered fertile until your urologist examines a post-vasectomy semen analysis after 20 ejaculations and a second one a few days after the first.  Do NOT resume unprotected sexual activity until your physician finds no sperm in your semen.    All stitches will dissolve on their own in 1-2 weeks.    Signs and Symptoms to Report  · A large amount of bleeding at the site.  · An unusual amount of pain.  · A large amount of swelling in the scrotum.  · Fever and chills.  · Any signs of infection, such as redness at the site or foul-smelling discharge    Risks  The risks of complication after vasectomy are very low.    A few of the risks include:  · Bleeding.  · Infection.  · Scrotal hematoma - a collection of blood in the scrotum.  · Inflammation of the epididymis - inflammation of a structure next to the testicle that helps in maturation of the sperm.  · Sperm granuloma - a collection of sperm that leaks out from the vas deferens,  forming a small nodule or lump. This does not usually cause any discomfort, but you may feel it in the scrotum.  · Recanalization - the restoration of the lumen or transport tube between the two ends of the vas deferens, possibly causing fertility.    If you have any questions or concerns, please call Dr. Drew at [phone] during regular office hours. If there are any problems after hours or on weekends, you may call [phone] and ask to talk to the Urologist on call.

## 2017-09-01 ENCOUNTER — OFFICE VISIT (OUTPATIENT)
Dept: SLEEP MEDICINE | Facility: CLINIC | Age: 42
End: 2017-09-01
Payer: COMMERCIAL

## 2017-09-01 VITALS
HEART RATE: 82 BPM | BODY MASS INDEX: 33.74 KG/M2 | WEIGHT: 249.13 LBS | HEIGHT: 72 IN | SYSTOLIC BLOOD PRESSURE: 110 MMHG | DIASTOLIC BLOOD PRESSURE: 70 MMHG

## 2017-09-01 DIAGNOSIS — G47.33 OBSTRUCTIVE SLEEP APNEA: ICD-10-CM

## 2017-09-01 DIAGNOSIS — G25.81 RLS (RESTLESS LEGS SYNDROME): Primary | ICD-10-CM

## 2017-09-01 PROCEDURE — 3078F DIAST BP <80 MM HG: CPT | Mod: S$GLB,,, | Performed by: NURSE PRACTITIONER

## 2017-09-01 PROCEDURE — 3074F SYST BP LT 130 MM HG: CPT | Mod: S$GLB,,, | Performed by: NURSE PRACTITIONER

## 2017-09-01 PROCEDURE — 99999 PR PBB SHADOW E&M-EST. PATIENT-LVL III: CPT | Mod: PBBFAC,,, | Performed by: NURSE PRACTITIONER

## 2017-09-01 PROCEDURE — 99214 OFFICE O/P EST MOD 30 MIN: CPT | Mod: S$GLB,,, | Performed by: NURSE PRACTITIONER

## 2017-09-01 PROCEDURE — 3008F BODY MASS INDEX DOCD: CPT | Mod: S$GLB,,, | Performed by: NURSE PRACTITIONER

## 2017-09-01 NOTE — PROGRESS NOTES
"Jaron Stein  was seen as a f/u today for mgt of RLS and obstructive sleep apnea.     Since last seen he has undergone a home sleep study and been setup with apap 7/28/17. He has continued to acclimate to wearing mask, now doing much better since tightening his straps he is able to sleep w/ mask on all night. No more snoring. No more nocturia. Daytime sleepiness improved. Mood is better. +increased energy. Now has energy to go to the gym and is losing weight! Wishes he had done this years ago. ESS=7. Denies pressure intolerance. Denies oral drying w/ouse of chin strap anymore. Denies nasal drying. Using it nightly. Requip 0.5mg 2 tab helping sleep onset and rls symptoms, no more kicking/moving legs during sleep or sleep onset. Denies nausea or OC behaviors. Has symptoms occasionally now.     Interrogation- new machine condition, 30davg 4.5h/n. Wisp XL fits best, AHI 1.7, 90% tile 8.9cm. Heat at 3. 0% periodic. 30/30d>4h.     HISTORY:  4/20/17  CHIEF COMPLAINT: Snoring, excessive daytime sleepiness    HISTORY OF PRESENT ILLNESS: Jaron Stein a 42 y.o. male presents for the evaluation of obstructive sleep apnea. He has never had a sleep study.  He snores loudly, disurptive. "wife says its terrible". Sleeping separately. Wakes up tired. Don't feel good throughout the day. Can easily fall asleep when sedentary at work or when home in evening watching tv. +witnessed apneic pauses. Denies am headaches. Wakes up trying to catch his breath. Tries to sleep on his side. Irritable and butts due to poor sleep. Nocturia 0-1x. ESS=13   Chronic allergies/claritin qd, allergy to some foods    When lies down has hard time falling asleep, knees ache, not painful, wants to keep moving legs or walk around house. Happens 6/7 nights, affecting sleep onset.      Sleeps ~ 10p, WT 6a  Could be 1-3hr sleep onset    FAMILY HISTORY: No known sleep disorders.   SOCIAL HISTORY: . Social ETOH, no tobacco. Manager/desk work    REVIEW OF " SYSTEMS:  Sleep related symptoms as per HPI; 7#loss, occasional sinus congestion.   Otherwise, a balance of 10 systems reviewed is negative        PHYSICAL EXAM:   /70   Pulse 82   Ht 6' (1.829 m)   Wt 113 kg (249 lb 1.9 oz)   BMI 33.79 kg/m²   GENERAL: Obese body habitus, well groomed     HST 6/6/17 AHI 10(RDI 20)/low sat 77.7%    ASSESSMENT:     LISA, mild, mod by RDI criteria. 9/1/17: Excellent adherence with apap, symptoms improved. AHI<5   He has medical comorbidities of obesity, hypertension ,anxiety    RLS--improved on Requip    PLAN:   1.  Continue APAP, adjust today 6-12cm. Continue nightly use. Alternative nasal mask when eligible  2. Discussed etiology of LISA, effectiveness of therapy, and potential ramifications of untreated LISA, including stroke, heart disease, HTN.    3.Encouraged continued weight loss efforts for potential improvement of LISA and overall health benefits  4. Continue Requip 0.5mg 2 tabs qhs.    5. RTC 1 yr, sooner if needed

## 2017-09-18 DIAGNOSIS — I10 ESSENTIAL HYPERTENSION: ICD-10-CM

## 2017-09-18 RX ORDER — NADOLOL 40 MG/1
40 TABLET ORAL DAILY
Qty: 30 TABLET | Refills: 3 | Status: SHIPPED | OUTPATIENT
Start: 2017-09-18 | End: 2018-03-05 | Stop reason: SDUPTHER

## 2017-10-06 ENCOUNTER — OFFICE VISIT (OUTPATIENT)
Dept: UROLOGY | Facility: CLINIC | Age: 42
End: 2017-10-06
Payer: COMMERCIAL

## 2017-10-06 VITALS — BODY MASS INDEX: 33.72 KG/M2 | WEIGHT: 249 LBS | HEIGHT: 72 IN

## 2017-10-06 DIAGNOSIS — Z30.09 STERILIZATION CONSULT: Primary | ICD-10-CM

## 2017-10-06 PROCEDURE — 99999 PR PBB SHADOW E&M-EST. PATIENT-LVL III: CPT | Mod: PBBFAC,,, | Performed by: UROLOGY

## 2017-10-06 PROCEDURE — 99024 POSTOP FOLLOW-UP VISIT: CPT | Mod: S$GLB,,, | Performed by: UROLOGY

## 2017-10-06 NOTE — PROGRESS NOTES
Subjective:       Patient ID: Jaron Stein is a 42 y.o. male who was last seen in this office 8/25/2017    Chief Complaint:   Chief Complaint   Patient presents with    Post-op Evaluation     post op from office vasectomy        Post-Operative Follow-up  Patient here for post-op follow-up. Patient is 6 weeks status post vasectomy.   The patient reports no problems with eating, bowel movements, voiding, or their wound. The patient is not having any pain.        ACTIVE MEDICAL ISSUES:  Patient Active Problem List   Diagnosis    Hypertension    Obesity    Unspecified sleep apnea    LISA (obstructive sleep apnea)       ALLERGIES AND MEDICATIONS: updated and reviewed.  Review of patient's allergies indicates:  No Known Allergies  Current Outpatient Prescriptions   Medication Sig    nadolol (CORGARD) 40 MG tablet Take 1 tablet (40 mg total) by mouth once daily.    ropinirole (REQUIP) 0.5 MG tablet TAKE 1 TO 2 TABLETS(0.5 TO 1 MG) BY MOUTH EVERY EVENING     No current facility-administered medications for this visit.        Review of Systems   Constitutional: Negative for activity change, fatigue, fever and unexpected weight change.   HENT: Negative for congestion.    Eyes: Negative for redness.   Respiratory: Negative for chest tightness and shortness of breath.    Cardiovascular: Negative for chest pain and leg swelling.   Gastrointestinal: Negative for abdominal pain, constipation, diarrhea, nausea and vomiting.   Genitourinary: Negative for dysuria, flank pain, frequency, hematuria, penile pain, penile swelling, scrotal swelling, testicular pain and urgency.   Musculoskeletal: Negative for arthralgias and back pain.   Neurological: Negative for dizziness and light-headedness.   Psychiatric/Behavioral: Negative for behavioral problems and confusion. The patient is not nervous/anxious.    All other systems reviewed and are negative.      Objective:      Vitals:    10/06/17 1349   Weight: 112.9 kg (249 lb)    Height: 6' (1.829 m)     Physical Exam   Nursing note and vitals reviewed.  Constitutional: He is oriented to person, place, and time. He appears well-developed and well-nourished.   HENT:   Head: Normocephalic.   Eyes: Conjunctivae are normal.   Neck: Normal range of motion. Neck supple. No tracheal deviation present. No thyromegaly present.   Cardiovascular: Normal rate and normal heart sounds.    Pulmonary/Chest: Effort normal and breath sounds normal. No respiratory distress. He has no wheezes.   Abdominal: Soft. Bowel sounds are normal. There is no hepatosplenomegaly. There is no tenderness. There is no rebound and no CVA tenderness. No hernia.   Musculoskeletal: Normal range of motion. He exhibits no edema or tenderness.   Lymphadenopathy:     He has no cervical adenopathy.   Neurological: He is alert and oriented to person, place, and time.   Skin: Skin is warm and dry. No rash noted. No erythema.     Psychiatric: He has a normal mood and affect. His behavior is normal. Judgment and thought content normal.       Urine dipstick shows negative for all components.  Micro exam: negative for WBC's or RBC's.    Semen: no sperm seen in 30 HPF    Assessment:       1. Sterilization consult          Plan:       1. Sterilization consult    - POCT Semen Post Vasectomy            No Follow-up on file.

## 2018-01-03 ENCOUNTER — PATIENT MESSAGE (OUTPATIENT)
Dept: SLEEP MEDICINE | Facility: CLINIC | Age: 43
End: 2018-01-03

## 2018-01-09 DIAGNOSIS — G25.81 RLS (RESTLESS LEGS SYNDROME): ICD-10-CM

## 2018-01-09 RX ORDER — ROPINIROLE 1 MG/1
1 TABLET, FILM COATED ORAL NIGHTLY
Qty: 90 TABLET | Refills: 3 | Status: SHIPPED | OUTPATIENT
Start: 2018-01-09 | End: 2018-12-27 | Stop reason: SDUPTHER

## 2018-02-26 ENCOUNTER — LAB VISIT (OUTPATIENT)
Dept: LAB | Facility: HOSPITAL | Age: 43
End: 2018-02-26
Attending: NURSE PRACTITIONER
Payer: COMMERCIAL

## 2018-02-26 ENCOUNTER — OFFICE VISIT (OUTPATIENT)
Dept: FAMILY MEDICINE | Facility: CLINIC | Age: 43
End: 2018-02-26
Payer: COMMERCIAL

## 2018-02-26 VITALS
OXYGEN SATURATION: 97 % | BODY MASS INDEX: 33.62 KG/M2 | SYSTOLIC BLOOD PRESSURE: 118 MMHG | WEIGHT: 248.25 LBS | HEART RATE: 76 BPM | HEIGHT: 72 IN | TEMPERATURE: 99 F | DIASTOLIC BLOOD PRESSURE: 72 MMHG

## 2018-02-26 DIAGNOSIS — I10 ESSENTIAL HYPERTENSION: ICD-10-CM

## 2018-02-26 DIAGNOSIS — R63.1 POLYDIPSIA: Primary | ICD-10-CM

## 2018-02-26 LAB
BACTERIA #/AREA URNS AUTO: ABNORMAL /HPF
BILIRUB UR QL STRIP: NEGATIVE
CLARITY UR REFRACT.AUTO: CLEAR
COLOR UR AUTO: YELLOW
GLUCOSE UR QL STRIP: NEGATIVE
HGB UR QL STRIP: NEGATIVE
HYALINE CASTS UR QL AUTO: 3 /LPF
KETONES UR QL STRIP: NEGATIVE
LEUKOCYTE ESTERASE UR QL STRIP: NEGATIVE
MICROSCOPIC COMMENT: ABNORMAL
NITRITE UR QL STRIP: NEGATIVE
PH UR STRIP: 6 [PH] (ref 5–8)
PROT UR QL STRIP: ABNORMAL
RBC #/AREA URNS AUTO: 0 /HPF (ref 0–4)
SP GR UR STRIP: 1.01 (ref 1–1.03)
URN SPEC COLLECT METH UR: ABNORMAL
UROBILINOGEN UR STRIP-ACNC: NEGATIVE EU/DL
WBC #/AREA URNS AUTO: 0 /HPF (ref 0–5)

## 2018-02-26 PROCEDURE — 99214 OFFICE O/P EST MOD 30 MIN: CPT | Mod: S$GLB,,, | Performed by: NURSE PRACTITIONER

## 2018-02-26 PROCEDURE — 3008F BODY MASS INDEX DOCD: CPT | Mod: S$GLB,,, | Performed by: NURSE PRACTITIONER

## 2018-02-26 PROCEDURE — 99999 PR PBB SHADOW E&M-EST. PATIENT-LVL IV: CPT | Mod: PBBFAC,,, | Performed by: NURSE PRACTITIONER

## 2018-02-26 PROCEDURE — 81001 URINALYSIS AUTO W/SCOPE: CPT

## 2018-02-26 NOTE — PROGRESS NOTES
This dictation has been generated using Dragon Dictation some phonetic errors may occur.     Jaron was seen today for annual exam.    Diagnoses and all orders for this visit:    Polydipsia  -     Hemoglobin A1c; Future    Essential hypertension  -     Hemoglobin A1c; Future  -     Comprehensive metabolic panel; Future  -     Urinalysis; Future      Pt request annual physical exam however technically due in May  Polyuria and polydipsia check A1c as above and rule out diabetes.  Also with hypertension reevaluate renal function. Also, check urinalysis to rule out urinary tract infection.    Follow-up in about 2 months (around 4/26/2018).      ________________________________________________________________  ________________________________________________________________        Chief Complaint   Patient presents with    Annual Exam     History of present illness  This 43 y.o. presents today for complaint of frequent and excessive urination.  Patient is scheduled for an annual physical examination however he is a couple of months early.  He does have complaint of voiding large volumes.  He goes about 10 times per day.  He only goes once at nighttime.  He denies any issues with his stream such as weak or trouble starting stream.  He does have some increased thirst but not excessive.   Review of systems  No fever or chills. Weight is improving. He has managed to lose since last visit but up more recently.   Patient denies dysuria.  No pelvic pain.  Denies low back pain  No chest pain or shortness of breath  No nausea vomiting or diarrhea.  No appetite changes    Past Medical History:   Diagnosis Date    Anxiety     Headache, migraine     Hypertension     Obesity        Past Surgical History:   Procedure Laterality Date    NO PAST SURGERIES      VASECTOMY         Family History   Problem Relation Age of Onset    Diabetes Mother        Social History     Social History    Marital status:      Spouse name:  N/A    Number of children: N/A    Years of education: N/A     Social History Main Topics    Smoking status: Former Smoker    Smokeless tobacco: Never Used      Comment: quit 6-7 years ago    Alcohol use Yes      Comment: socially    Drug use: No    Sexual activity: Yes     Partners: Female     Other Topics Concern    None     Social History Narrative    None       Current Outpatient Prescriptions   Medication Sig Dispense Refill    nadolol (CORGARD) 40 MG tablet Take 1 tablet (40 mg total) by mouth once daily. 30 tablet 3    ropinirole (REQUIP) 0.5 MG tablet TAKE 1 TO 2 TABLETS(0.5 TO 1 MG) BY MOUTH EVERY EVENING 60 tablet 5    rOPINIRole (REQUIP) 1 MG tablet Take 1 tablet (1 mg total) by mouth every evening. 90 tablet 3     No current facility-administered medications for this visit.        Review of patient's allergies indicates:  No Known Allergies    Physical examination  Vitals Reviewed  Gen. Well-dressed well-nourished no apparent distress  Skin warm dry and intact.  No rashes noted.  Neuro. Awake alert oriented x4.  Normal judgment and cognition noted.  Extremities no clubbing cyanosis or edema noted.     Call or return to clinic prn if these symptoms worsen or fail to improve as anticipated.

## 2018-03-02 ENCOUNTER — PATIENT MESSAGE (OUTPATIENT)
Dept: FAMILY MEDICINE | Facility: CLINIC | Age: 43
End: 2018-03-02

## 2018-03-02 DIAGNOSIS — I10 ESSENTIAL HYPERTENSION: ICD-10-CM

## 2018-03-02 DIAGNOSIS — F41.9 ANXIETY: ICD-10-CM

## 2018-03-05 RX ORDER — PAROXETINE HYDROCHLORIDE 20 MG/1
20 TABLET, FILM COATED ORAL EVERY MORNING
Qty: 90 TABLET | Refills: 0 | Status: SHIPPED | OUTPATIENT
Start: 2018-03-05 | End: 2018-03-05 | Stop reason: SDUPTHER

## 2018-03-05 RX ORDER — NADOLOL 40 MG/1
40 TABLET ORAL DAILY
Qty: 30 TABLET | Refills: 3 | Status: SHIPPED | OUTPATIENT
Start: 2018-03-05 | End: 2018-09-19 | Stop reason: SDUPTHER

## 2018-03-05 RX ORDER — PAROXETINE HYDROCHLORIDE 20 MG/1
20 TABLET, FILM COATED ORAL EVERY MORNING
Qty: 30 TABLET | Refills: 0 | Status: SHIPPED | OUTPATIENT
Start: 2018-03-05 | End: 2018-04-23

## 2018-03-05 NOTE — TELEPHONE ENCOUNTER
Patient requesting to have a script call to [facility] until he gets medicine for mailorder.

## 2018-04-23 DIAGNOSIS — F41.9 ANXIETY: ICD-10-CM

## 2018-04-23 RX ORDER — PAROXETINE HYDROCHLORIDE 20 MG/1
20 TABLET, FILM COATED ORAL EVERY MORNING
Qty: 90 TABLET | Refills: 0 | Status: SHIPPED | OUTPATIENT
Start: 2018-04-23 | End: 2018-08-06 | Stop reason: SDUPTHER

## 2018-06-04 ENCOUNTER — OFFICE VISIT (OUTPATIENT)
Dept: URGENT CARE | Facility: CLINIC | Age: 43
End: 2018-06-04
Payer: COMMERCIAL

## 2018-06-04 VITALS
BODY MASS INDEX: 34.54 KG/M2 | TEMPERATURE: 98 F | WEIGHT: 255 LBS | HEART RATE: 78 BPM | OXYGEN SATURATION: 98 % | DIASTOLIC BLOOD PRESSURE: 65 MMHG | RESPIRATION RATE: 18 BRPM | SYSTOLIC BLOOD PRESSURE: 105 MMHG | HEIGHT: 72 IN

## 2018-06-04 DIAGNOSIS — H10.9 BACTERIAL CONJUNCTIVITIS: ICD-10-CM

## 2018-06-04 DIAGNOSIS — J01.40 ACUTE NON-RECURRENT PANSINUSITIS: Primary | ICD-10-CM

## 2018-06-04 PROCEDURE — 99213 OFFICE O/P EST LOW 20 MIN: CPT | Mod: S$GLB,,, | Performed by: NURSE PRACTITIONER

## 2018-06-04 PROCEDURE — 3078F DIAST BP <80 MM HG: CPT | Mod: CPTII,S$GLB,, | Performed by: NURSE PRACTITIONER

## 2018-06-04 PROCEDURE — 3074F SYST BP LT 130 MM HG: CPT | Mod: CPTII,S$GLB,, | Performed by: NURSE PRACTITIONER

## 2018-06-04 RX ORDER — AMOXICILLIN AND CLAVULANATE POTASSIUM 875; 125 MG/1; MG/1
1 TABLET, FILM COATED ORAL 2 TIMES DAILY
Qty: 20 TABLET | Refills: 0 | Status: SHIPPED | OUTPATIENT
Start: 2018-06-04 | End: 2018-06-14

## 2018-06-04 RX ORDER — FLUTICASONE PROPIONATE 50 MCG
2 SPRAY, SUSPENSION (ML) NASAL DAILY
Qty: 1 BOTTLE | Refills: 0 | Status: SHIPPED | OUTPATIENT
Start: 2018-06-04 | End: 2018-06-14

## 2018-06-04 RX ORDER — BENZONATATE 100 MG/1
200 CAPSULE ORAL 3 TIMES DAILY PRN
Qty: 30 CAPSULE | Refills: 0 | Status: SHIPPED | OUTPATIENT
Start: 2018-06-04 | End: 2018-06-14

## 2018-06-04 RX ORDER — CIPROFLOXACIN HYDROCHLORIDE 3 MG/ML
1 SOLUTION/ DROPS OPHTHALMIC EVERY 4 HOURS
Qty: 10 ML | Refills: 0 | Status: SHIPPED | OUTPATIENT
Start: 2018-06-04 | End: 2018-06-11

## 2018-06-04 NOTE — PATIENT INSTRUCTIONS
Please follow up with your primary care provider if you are not feeling better in 7-10 days.    Please drink plenty of fluids.  Please get plenty of rest.    Please return here or go to the Emergency Department for any concerns or worsening of condition.    If you were prescribed antibiotics, please take them to completion.    If you do not have Hypertension or any history of palpitations, it is ok to take over the counter Sudafed or Mucinex D or Allegra-D or Claritin-D or Zyrtec-D.  If you do take one of the above, it is ok to combine that with plain over the counter Mucinex or Allegra or Claritin or Zyrtec.  If for example you are taking Zyrtec -D, you can combine that with Mucinex, but not Mucinex-D.  If you are taking Mucinex-D, you can combine that with plain Allegra or Claritin or Zyrtec.     If you do have Hypertension or palpitations, it is safe to take Coricidin HBP or Mucinex DM for relief of congestion and cough. Take as directed on bottle with at least 2 glasses of water.    If not allergic, please take over the counter Tylenol (Acetaminophen) and/or Motrin (Ibuprofen) as directed on bottle for control of pain and/or fever.    Please follow up with your primary care doctor or specialist as needed.    If you  smoke, please stop smoking.    Sinusitis (Antibiotic Treatment)    The sinuses are air-filled spaces within the bones of the face. They connect to the inside of the nose. Sinusitis is an inflammation of the tissue lining the sinus cavity. Sinus inflammation can occur during a cold. It can also be due to allergies to pollens and other particles in the air. Sinusitis can cause symptoms of sinus congestion and fullness. A sinus infection causes fever, headache and facial pain. There is often green or yellow drainage from the nose or into the back of the throat (post-nasal drip). You have been given antibiotics to treat this condition.  Home care:  · Take the full course of antibiotics as instructed. Do  not stop taking them, even if you feel better.  · Drink plenty of water, hot tea, and other liquids. This may help thin mucus. It also may promote sinus drainage.  · Heat may help soothe painful areas of the face. Use a towel soaked in hot water. Or,  the shower and direct the hot spray onto your face. Using a vaporizer along with a menthol rub at night may also help.   · An expectorant containing guaifenesin may help thin the mucus and promote drainage from the sinuses.  · Over-the-counter decongestants may be used unless a similar medicine was prescribed. Nasal sprays work the fastest. Use one that contains phenylephrine or oxymetazoline. First blow the nose gently. Then use the spray. Do not use these medicines more often than directed on the label or symptoms may get worse. You may also use tablets containing pseudoephedrine. Avoid products that combine ingredients, because side effects may be increased. Read labels. You can also ask the pharmacist for help. (NOTE: Persons with high blood pressure should not use decongestants. They can raise blood pressure.)  · Over-the-counter antihistamines may help if allergies contributed to your sinusitis.    · Do not use nasal rinses or irrigation during an acute sinus infection, unless told to by your health care provider. Rinsing may spread the infection to other sinuses.  · Use acetaminophen or ibuprofen to control pain, unless another pain medicine was prescribed. (If you have chronic liver or kidney disease or ever had a stomach ulcer, talk with your doctor before using these medicines. Aspirin should never be used in anyone under 18 years of age who is ill with a fever. It may cause severe liver damage.)  · Don't smoke. This can worsen symptoms.  Follow-up care  Follow up with your healthcare provider or our staff if you are not improving within the next week.  When to seek medical advice  Call your healthcare provider if any of these occur:  · Facial pain or  headache becoming more severe  · Stiff neck  · Unusual drowsiness or confusion  · Swelling of the forehead or eyelids  · Vision problems, including blurred or double vision  · Fever of 100.4ºF (38ºC) or higher, or as directed by your healthcare provider  · Seizure  · Breathing problems  · Symptoms not resolving within 10 days  Date Last Reviewed: 4/13/2015  © 7820-4658 [employer]. [address] [place] [address], [place] [address]. All rights reserved. This information is not intended as a substitute for professional medical care. Always follow your healthcare professional's instructions.

## 2018-06-19 ENCOUNTER — OFFICE VISIT (OUTPATIENT)
Dept: URGENT CARE | Facility: CLINIC | Age: 43
End: 2018-06-19
Payer: COMMERCIAL

## 2018-06-19 ENCOUNTER — PATIENT MESSAGE (OUTPATIENT)
Dept: FAMILY MEDICINE | Facility: CLINIC | Age: 43
End: 2018-06-19

## 2018-06-19 VITALS
SYSTOLIC BLOOD PRESSURE: 121 MMHG | HEART RATE: 96 BPM | HEIGHT: 72 IN | BODY MASS INDEX: 34.54 KG/M2 | DIASTOLIC BLOOD PRESSURE: 79 MMHG | TEMPERATURE: 98 F | OXYGEN SATURATION: 98 % | WEIGHT: 255 LBS

## 2018-06-19 DIAGNOSIS — H10.33 ACUTE CONJUNCTIVITIS OF BOTH EYES, UNSPECIFIED ACUTE CONJUNCTIVITIS TYPE: ICD-10-CM

## 2018-06-19 DIAGNOSIS — J30.2 SEASONAL ALLERGIC RHINITIS, UNSPECIFIED TRIGGER: Primary | ICD-10-CM

## 2018-06-19 PROCEDURE — 3074F SYST BP LT 130 MM HG: CPT | Mod: CPTII,S$GLB,, | Performed by: NURSE PRACTITIONER

## 2018-06-19 PROCEDURE — 96372 THER/PROPH/DIAG INJ SC/IM: CPT | Mod: S$GLB,,, | Performed by: NURSE PRACTITIONER

## 2018-06-19 PROCEDURE — 99214 OFFICE O/P EST MOD 30 MIN: CPT | Mod: 25,S$GLB,, | Performed by: NURSE PRACTITIONER

## 2018-06-19 PROCEDURE — 3008F BODY MASS INDEX DOCD: CPT | Mod: CPTII,S$GLB,, | Performed by: NURSE PRACTITIONER

## 2018-06-19 PROCEDURE — 3078F DIAST BP <80 MM HG: CPT | Mod: CPTII,S$GLB,, | Performed by: NURSE PRACTITIONER

## 2018-06-19 RX ORDER — LEVOCETIRIZINE DIHYDROCHLORIDE 5 MG/1
5 TABLET, FILM COATED ORAL NIGHTLY
Qty: 30 TABLET | Refills: 11 | Status: SHIPPED | OUTPATIENT
Start: 2018-06-19 | End: 2020-07-21

## 2018-06-19 RX ORDER — AZELASTINE 1 MG/ML
1 SPRAY, METERED NASAL 2 TIMES DAILY
Qty: 30 ML | Refills: 0 | Status: SHIPPED | OUTPATIENT
Start: 2018-06-19 | End: 2020-07-21

## 2018-06-19 RX ORDER — BETAMETHASONE SODIUM PHOSPHATE AND BETAMETHASONE ACETATE 3; 3 MG/ML; MG/ML
9 INJECTION, SUSPENSION INTRA-ARTICULAR; INTRALESIONAL; INTRAMUSCULAR; SOFT TISSUE
Status: COMPLETED | OUTPATIENT
Start: 2018-06-19 | End: 2018-06-19

## 2018-06-19 RX ORDER — OLOPATADINE HYDROCHLORIDE 2 MG/ML
1 SOLUTION/ DROPS OPHTHALMIC DAILY
Qty: 2.5 ML | Refills: 0 | Status: SHIPPED | OUTPATIENT
Start: 2018-06-19 | End: 2018-10-23

## 2018-06-19 RX ADMIN — BETAMETHASONE SODIUM PHOSPHATE AND BETAMETHASONE ACETATE 9 MG: 3; 3 INJECTION, SUSPENSION INTRA-ARTICULAR; INTRALESIONAL; INTRAMUSCULAR; SOFT TISSUE at 03:06

## 2018-06-19 NOTE — PROGRESS NOTES
Subjective:       Patient ID: Jaron Stein is a 43 y.o. male.    Vitals:  height is 6' (1.829 m) and weight is 115.7 kg (255 lb). His temperature is 98 °F (36.7 °C). His blood pressure is 121/79 and his pulse is 96. His oxygen saturation is 98%.     Chief Complaint: Sinus Problem    Pt was seen in the clinic on 06/04/18 with the same complaints. States he completed the Augmentin and the eye drops and was ok for a day after he finished he meds and then it all came back.       Sinus Problem   This is a new problem. The current episode started in the past 7 days. The problem has been gradually worsening since onset. There has been no fever. Associated symptoms include congestion, coughing, headaches, sinus pressure and a sore throat. Pertinent negatives include no chills, ear pain, hoarse voice or shortness of breath. Treatments tried: mucinex  The treatment provided no relief.     Review of Systems   Constitution: Positive for malaise/fatigue. Negative for chills and fever.   HENT: Positive for congestion, sinus pressure and sore throat. Negative for ear pain and hoarse voice.    Eyes: Positive for redness. Negative for blurred vision, discharge and double vision.   Cardiovascular: Negative for chest pain, dyspnea on exertion and leg swelling.   Respiratory: Positive for cough. Negative for shortness of breath, sputum production and wheezing.    Hematologic/Lymphatic: Negative for adenopathy.   Skin: Negative for color change and rash.   Musculoskeletal: Negative for myalgias.   Gastrointestinal: Negative for abdominal pain and nausea.   Neurological: Positive for headaches. Negative for dizziness and light-headedness.   All other systems reviewed and are negative.      Objective:      Physical Exam   Constitutional: He is oriented to person, place, and time. Vital signs are normal. He appears well-developed and well-nourished.  Non-toxic appearance. He does not have a sickly appearance. He appears ill.   HENT:    Head: Normocephalic and atraumatic.   Right Ear: Hearing, external ear and ear canal normal. A middle ear effusion is present.   Left Ear: Hearing, external ear and ear canal normal. A middle ear effusion is present.   Nose: Mucosal edema and rhinorrhea present. Right sinus exhibits no maxillary sinus tenderness and no frontal sinus tenderness. Left sinus exhibits no maxillary sinus tenderness and no frontal sinus tenderness.   Mouth/Throat: Uvula is midline and mucous membranes are normal. Oropharyngeal exudate and posterior oropharyngeal erythema present.   Eyes: EOM are normal. Pupils are equal, round, and reactive to light. Right eye exhibits discharge. Left eye exhibits discharge. Right conjunctiva is injected. Left conjunctiva is injected. No scleral icterus.   Clear, runny discharge from bilat eyes   Neck: Trachea normal, normal range of motion, full passive range of motion without pain and phonation normal. Neck supple. No spinous process tenderness and no muscular tenderness present. No neck rigidity. Normal range of motion present.   Cardiovascular: Normal rate, regular rhythm and normal heart sounds.    Pulmonary/Chest: Effort normal and breath sounds normal.   Musculoskeletal: Normal range of motion.   Lymphadenopathy:     He has cervical adenopathy.   Neurological: He is alert and oriented to person, place, and time.   Skin: Skin is warm and dry. Capillary refill takes less than 2 seconds. No erythema.   Psychiatric: He has a normal mood and affect.   Nursing note and vitals reviewed.      Assessment:       1. Seasonal allergic rhinitis, unspecified trigger    2. Acute conjunctivitis of both eyes, unspecified acute conjunctivitis type        Plan:         Seasonal allergic rhinitis, unspecified trigger  -     betamethasone acetate-betamethasone sodium phosphate injection 9 mg; Inject 1.5 mLs (9 mg total) into the muscle one time.  -     azelastine (ASTELIN) 137 mcg (0.1 %) nasal spray; 1 spray (137  mcg total) by Nasal route 2 (two) times daily.  Dispense: 30 mL; Refill: 0  -     levocetirizine (XYZAL) 5 MG tablet; Take 1 tablet (5 mg total) by mouth every evening.  Dispense: 30 tablet; Refill: 11  -     Ambulatory Referral to ENT    Acute conjunctivitis of both eyes, unspecified acute conjunctivitis type  -     olopatadine (PATADAY) 0.2 % Drop; Place 1 drop into both eyes once daily.  Dispense: 2.5 mL; Refill: 0  -     levocetirizine (XYZAL) 5 MG tablet; Take 1 tablet (5 mg total) by mouth every evening.  Dispense: 30 tablet; Refill: 11  -     Ambulatory Referral to ENT      Patient Instructions       A referral to ENT has been placed for you to follow up.   Please drink plenty of fluids.  Please get plenty of rest.  Please return here or go to the Emergency Department for any concerns or worsening of condition.  If you were prescribed a narcotic medication, do not drive or operate heavy equipment or machinery while taking these medications.  Please take over the counter Pepcid or Zantac as directed for the next 24-72hours as needed.  If you were given a steroid shot in the clinic and have also been given a prescription for a steroid such as Prednisone or a Medrol Dose Pack, please begin taking them tomorrow.  If you have a localized reaction it is ok to apply topical Benadryl and/or Cortaid as directed to the affected area.  Please take over the counter Benadryl as directed for the next 24-72 hours as needed for itching unless it makes you sleepy, then you can take over the counter Allegra or Claritin or Zyrtec as directed during the daytime hours as these generally do not cause drowsiness.  Please follow up with your primary care doctor or specialist as needed.    If you  smoke, please stop smoking.    Causes of Nasal Allergies  Nasal allergies are most commonly caused by one or more of 4 kinds of allergens: pollen (which causes seasonal allergies), house-dust mites, mold, and animals. Other substances,  called irritants, can bother the nose and make allergy symptoms worse.    Pollen  Plants reproduce by moving tiny grains of pollen from plant to plant. Some pollen is carried by bees, and some is blown by the wind. Its the wind-blown pollen that causes nasal allergies. The amount of pollen in the air varies from season to season.  House-dust mites  House-dust mites are tiny bugs too small to see. They can live in mattresses, blankets, stuffed toys, carpets, and curtains. The droppings of these mites are a common indoor cause of nasal allergies.  Mold  Mold loves dark, damp areas. It tends to grow in bathrooms, basements, refrigerators, and in the soil of houseplants. Mold reproduces by sending tiny grains called spores into the air. If these spores are breathed in, they can cause a nasal allergic reaction.  Animals  Pets, such as cats, dogs, birds, horses, and rabbits, are common causes of nasal allergies. Flakes of skin (dander), saliva left on fur when an animal cleans itself, urine in litter boxes and cages, and feathers can all cause nasal allergies.  Irritants make allergies worse  Although irritants dont cause nasal allergies, they can make allergy symptoms worse. Cigarette smoke, perfume, aerosol sprays, smoke from wood stoves or fireplaces, car exhaust, and strong odors are examples of irritants.   Date Last Reviewed: 9/1/2016 © 2000-2017 [employer]. [address] [place] [address]. All rights reserved. This information is not intended as a substitute for professional medical care. Always follow your healthcare professional's instructions.        Conjunctivitis, Nonspecific    The membrane that covers the white part of your eye (the conjunctiva) is inflamed. Inflammation happens when your body responds to an injury, allergic reaction, infection, or illness. Symptoms of inflammation in the eye may include redness, irritation, itching, swelling, or burning. These symptoms should go  away within the next 24 hours. Conjunctivitis may be related to a particle that was in your eye. If so, it may wash out with your tears or irrigation treatment. Being exposed to liquid chemicals or fumes may also cause this reaction.   Home care  · Apply a cold pack (ice in a plastic bag, wrapped in a towel) over the eye for 20 minutes at a time. This will reduce pain.  · Artificial tears may be prescribed to reduce irritation or redness.  These should be used 3 to 4 times a day.  · You may use acetaminophen or ibuprofen to control pain, unless another medicine was prescribed.(Note: If you have chronic liver or kidney disease, or if you have ever had a stomach ulcer or gastrointestinal bleeding, talk with your healthcare provider before using these medicines.)  Follow-up care  Follow up with your healthcare provider, or as advised.  When to seek medical advice  Call your healthcare provider right away if any of these occur:  · Increased eyelid swelling  · Increased eye pain  · Increased redness or drainage from the eye  · Increased blurry vision or increased sensitivity to light  · Failure of normal vision to return within 24 to 48 hours  Date Last Reviewed: 6/14/2015  © 4494-5710 [employer]. [address] [place] [address]. All rights reserved. This information is not intended as a substitute for professional medical care. Always follow your healthcare professional's instructions.        Seasonal Allergy  Seasonal allergy is also called hay fever. It may occur after a person is exposed to pollens released from grasses, weeds, trees and shrubs. This type of allergy occurs during the spring and summer when the pollen contacts the lining of the nose, eyes, eyelids, sinuses and throat. This causes histamine to be released from the tissues. Histamine causes itching and swelling. This may produce a watery discharge from the eyes or nose. Violent sneezing, nasal congestion, post-nasal drip,  itching of the eyes, nose, throat and mouth, scratchy throat, and dry cough may also occur.  Home care  Seasonal allergy cannot be cured, but symptoms can be reduced by these measures:  · Avoid or reduce exposure to the allergen as much as you can:    ¨ Stay indoors on windy days of pollen season.   ¨ Keep windows and doors closed. Use air conditioning instead in your home and car. This filters the air.  ¨ Change air conditioner filters often.  ¨ Take a shower, wash your hair, and change clothes after being outdoors.  ¨ Put on a NIOSH-rated 95 filter mask when working outdoors. Before going outside, take your allergy medicine as advised by your healthcare provider.  · Decongestant pills and sprays reduce tissue swelling and watery discharge. Overuse of nasal decongestant sprays may make symptoms worse. Do not use these more often than recommended. Sometimes you can experience a rebound effect (symptoms worsen), when stopping them. Talk to your healthcare provider or pharmacist about these medicines before taking them, especially if you have high blood pressure or heart problems.   · Antihistamines block the release of histamine during the allergic response. They work better when taken before symptoms develop. Unless a prescription antihistamine was prescribed, you can take over-the-counter antihistamines that do not cause drowsiness.  Ask your pharmacist for suggestions.  · Steroid nasal sprays or oral steroids may also be prescribed for more severe symptoms. These help to reduce the local inflammation that can add to the allergic response.  · If you have asthma, pollen season may make your asthma symptoms worse. It is important that you use your asthma medicines as directed during this time to prevent or treat attacks. Some persons with asthma have asthma symptoms that get worse when they take antihistamines. This is due to the drying effect on the lungs. If you notice this, stop the antihistamines, drink extra  fluids and notify your doctor.  · If you have sinus congestion or drainage, a saline nasal rinse may give relief. A saline nasal rinse lessens the swelling and clears excess mucus. This allows sinuses to drain. Prepackaged kits are sold at most drug stores. These contain pre-mixed salt packets and an irrigation device.  Follow-up care  Follow up with your healthcare provider or as directed. If you have been referred to a specialist, make an appointment promptly.  When to seek medical advice  Call your healthcare provider for any of the following:  · Facial, ear or sinus pain; colored drainage from the nose  · Headaches  · You have asthma and your asthma symptoms do not respond to the usual doses of your medicine  · Cough with colored sputum (mucus)  · Fever of 100.4°F (38°C) or higher, or as directed by the healthcare provider  Call 911 if any of these occur:  · Trouble breathing or swallowing, wheezing  · Hoarse voice, trouble speaking, or drooling  · Confusion  · Very drowsy or trouble awakening  · Fainting or loss of consciousness  · Rapid heart rate, or weak pulse  · Low blood pressure  · Feeling of doom  · Nausea, vomiting, abdominal pain, diarrhea  · Vomiting blood, or large amounts of blood in stool  · Seizure  · Cold, moist, or pale (blue in color) skin  Date Last Reviewed: 5/1/2017  © 9029-9560 [employer]. [address] [place] [address], [place] [address]. All rights reserved. This information is not intended as a substitute for professional medical care. Always follow your healthcare professional's instructions.

## 2018-06-19 NOTE — PATIENT INSTRUCTIONS
A referral to ENT has been placed for you to follow up.   Please drink plenty of fluids.  Please get plenty of rest.  Please return here or go to the Emergency Department for any concerns or worsening of condition.  If you were prescribed a narcotic medication, do not drive or operate heavy equipment or machinery while taking these medications.  Please take over the counter Pepcid or Zantac as directed for the next 24-72hours as needed.  If you were given a steroid shot in the clinic and have also been given a prescription for a steroid such as Prednisone or a Medrol Dose Pack, please begin taking them tomorrow.  If you have a localized reaction it is ok to apply topical Benadryl and/or Cortaid as directed to the affected area.  Please take over the counter Benadryl as directed for the next 24-72 hours as needed for itching unless it makes you sleepy, then you can take over the counter Allegra or Claritin or Zyrtec as directed during the daytime hours as these generally do not cause drowsiness.  Please follow up with your primary care doctor or specialist as needed.    If you  smoke, please stop smoking.    Causes of Nasal Allergies  Nasal allergies are most commonly caused by one or more of 4 kinds of allergens: pollen (which causes seasonal allergies), house-dust mites, mold, and animals. Other substances, called irritants, can bother the nose and make allergy symptoms worse.    Pollen  Plants reproduce by moving tiny grains of pollen from plant to plant. Some pollen is carried by bees, and some is blown by the wind. Its the wind-blown pollen that causes nasal allergies. The amount of pollen in the air varies from season to season.  House-dust mites  House-dust mites are tiny bugs too small to see. They can live in mattresses, blankets, stuffed toys, carpets, and curtains. The droppings of these mites are a common indoor cause of nasal allergies.  Mold  Mold loves dark, damp areas. It tends to grow in  bathrooms, basements, refrigerators, and in the soil of houseplants. Mold reproduces by sending tiny grains called spores into the air. If these spores are breathed in, they can cause a nasal allergic reaction.  Animals  Pets, such as cats, dogs, birds, horses, and rabbits, are common causes of nasal allergies. Flakes of skin (dander), saliva left on fur when an animal cleans itself, urine in litter boxes and cages, and feathers can all cause nasal allergies.  Irritants make allergies worse  Although irritants dont cause nasal allergies, they can make allergy symptoms worse. Cigarette smoke, perfume, aerosol sprays, smoke from wood stoves or fireplaces, car exhaust, and strong odors are examples of irritants.   Date Last Reviewed: 9/1/2016  © 1222-8791 [employer]. [address] [place] [address]. All rights reserved. This information is not intended as a substitute for professional medical care. Always follow your healthcare professional's instructions.        Conjunctivitis, Nonspecific    The membrane that covers the white part of your eye (the conjunctiva) is inflamed. Inflammation happens when your body responds to an injury, allergic reaction, infection, or illness. Symptoms of inflammation in the eye may include redness, irritation, itching, swelling, or burning. These symptoms should go away within the next 24 hours. Conjunctivitis may be related to a particle that was in your eye. If so, it may wash out with your tears or irrigation treatment. Being exposed to liquid chemicals or fumes may also cause this reaction.   Home care  · Apply a cold pack (ice in a plastic bag, wrapped in a towel) over the eye for 20 minutes at a time. This will reduce pain.  · Artificial tears may be prescribed to reduce irritation or redness.  These should be used 3 to 4 times a day.  · You may use acetaminophen or ibuprofen to control pain, unless another medicine was prescribed.(Note: If you have  chronic liver or kidney disease, or if you have ever had a stomach ulcer or gastrointestinal bleeding, talk with your healthcare provider before using these medicines.)  Follow-up care  Follow up with your healthcare provider, or as advised.  When to seek medical advice  Call your healthcare provider right away if any of these occur:  · Increased eyelid swelling  · Increased eye pain  · Increased redness or drainage from the eye  · Increased blurry vision or increased sensitivity to light  · Failure of normal vision to return within 24 to 48 hours  Date Last Reviewed: 6/14/2015 © 2000-2017 [employer]. [address] [place] [address]. All rights reserved. This information is not intended as a substitute for professional medical care. Always follow your healthcare professional's instructions.        Seasonal Allergy  Seasonal allergy is also called hay fever. It may occur after a person is exposed to pollens released from grasses, weeds, trees and shrubs. This type of allergy occurs during the spring and summer when the pollen contacts the lining of the nose, eyes, eyelids, sinuses and throat. This causes histamine to be released from the tissues. Histamine causes itching and swelling. This may produce a watery discharge from the eyes or nose. Violent sneezing, nasal congestion, post-nasal drip, itching of the eyes, nose, throat and mouth, scratchy throat, and dry cough may also occur.  Home care  Seasonal allergy cannot be cured, but symptoms can be reduced by these measures:  · Avoid or reduce exposure to the allergen as much as you can:    ¨ Stay indoors on windy days of pollen season.   ¨ Keep windows and doors closed. Use air conditioning instead in your home and car. This filters the air.  ¨ Change air conditioner filters often.  ¨ Take a shower, wash your hair, and change clothes after being outdoors.  ¨ Put on a NIOSH-rated 95 filter mask when working outdoors. Before going outside,  take your allergy medicine as advised by your healthcare provider.  · Decongestant pills and sprays reduce tissue swelling and watery discharge. Overuse of nasal decongestant sprays may make symptoms worse. Do not use these more often than recommended. Sometimes you can experience a rebound effect (symptoms worsen), when stopping them. Talk to your healthcare provider or pharmacist about these medicines before taking them, especially if you have high blood pressure or heart problems.   · Antihistamines block the release of histamine during the allergic response. They work better when taken before symptoms develop. Unless a prescription antihistamine was prescribed, you can take over-the-counter antihistamines that do not cause drowsiness.  Ask your pharmacist for suggestions.  · Steroid nasal sprays or oral steroids may also be prescribed for more severe symptoms. These help to reduce the local inflammation that can add to the allergic response.  · If you have asthma, pollen season may make your asthma symptoms worse. It is important that you use your asthma medicines as directed during this time to prevent or treat attacks. Some persons with asthma have asthma symptoms that get worse when they take antihistamines. This is due to the drying effect on the lungs. If you notice this, stop the antihistamines, drink extra fluids and notify your doctor.  · If you have sinus congestion or drainage, a saline nasal rinse may give relief. A saline nasal rinse lessens the swelling and clears excess mucus. This allows sinuses to drain. Prepackaged kits are sold at most drug stores. These contain pre-mixed salt packets and an irrigation device.  Follow-up care  Follow up with your healthcare provider or as directed. If you have been referred to a specialist, make an appointment promptly.  When to seek medical advice  Call your healthcare provider for any of the following:  · Facial, ear or sinus pain; colored drainage from the  nose  · Headaches  · You have asthma and your asthma symptoms do not respond to the usual doses of your medicine  · Cough with colored sputum (mucus)  · Fever of 100.4°F (38°C) or higher, or as directed by the healthcare provider  Call 911 if any of these occur:  · Trouble breathing or swallowing, wheezing  · Hoarse voice, trouble speaking, or drooling  · Confusion  · Very drowsy or trouble awakening  · Fainting or loss of consciousness  · Rapid heart rate, or weak pulse  · Low blood pressure  · Feeling of doom  · Nausea, vomiting, abdominal pain, diarrhea  · Vomiting blood, or large amounts of blood in stool  · Seizure  · Cold, moist, or pale (blue in color) skin  Date Last Reviewed: 5/1/2017  © 2257-5975 [employer]. [address] [place] [address], [place] [address]. All rights reserved. This information is not intended as a substitute for professional medical care. Always follow your healthcare professional's instructions.

## 2018-08-06 DIAGNOSIS — F41.9 ANXIETY: ICD-10-CM

## 2018-08-06 RX ORDER — PAROXETINE HYDROCHLORIDE 20 MG/1
20 TABLET, FILM COATED ORAL EVERY MORNING
Qty: 90 TABLET | Refills: 0 | Status: SHIPPED | OUTPATIENT
Start: 2018-08-06 | End: 2018-11-03 | Stop reason: SDUPTHER

## 2018-08-27 DIAGNOSIS — I10 ESSENTIAL HYPERTENSION: ICD-10-CM

## 2018-08-27 RX ORDER — NADOLOL 40 MG/1
40 TABLET ORAL DAILY
Qty: 30 TABLET | Refills: 3 | OUTPATIENT
Start: 2018-08-27

## 2018-09-18 ENCOUNTER — PATIENT MESSAGE (OUTPATIENT)
Dept: FAMILY MEDICINE | Facility: CLINIC | Age: 43
End: 2018-09-18

## 2018-09-18 DIAGNOSIS — I10 ESSENTIAL HYPERTENSION: ICD-10-CM

## 2018-09-19 RX ORDER — NADOLOL 40 MG/1
40 TABLET ORAL DAILY
Qty: 30 TABLET | Refills: 0 | Status: SHIPPED | OUTPATIENT
Start: 2018-09-19 | End: 2018-10-16 | Stop reason: SDUPTHER

## 2018-09-19 NOTE — TELEPHONE ENCOUNTER
He needs to follow up with Dr. Peoples. [facility] only sent. He is due for an annual physical exam(see note from February)  Let him know that I am leaving the clinic.

## 2018-09-20 ENCOUNTER — PATIENT MESSAGE (OUTPATIENT)
Dept: FAMILY MEDICINE | Facility: CLINIC | Age: 43
End: 2018-09-20

## 2018-09-20 ENCOUNTER — TELEPHONE (OUTPATIENT)
Dept: FAMILY MEDICINE | Facility: CLINIC | Age: 43
End: 2018-09-20

## 2018-09-20 NOTE — TELEPHONE ENCOUNTER
----- Message from Renetta Cochran sent at 9/19/2018  2:34 PM CDT -----  Contact: self / [phone]  Pt returned staff's call.

## 2018-10-16 DIAGNOSIS — I10 ESSENTIAL HYPERTENSION: ICD-10-CM

## 2018-10-17 RX ORDER — NADOLOL 40 MG/1
TABLET ORAL
Qty: 30 TABLET | Refills: 0 | Status: SHIPPED | OUTPATIENT
Start: 2018-10-17 | End: 2018-10-23 | Stop reason: SDUPTHER

## 2018-10-23 ENCOUNTER — LAB VISIT (OUTPATIENT)
Dept: LAB | Facility: HOSPITAL | Age: 43
End: 2018-10-23
Attending: FAMILY MEDICINE
Payer: COMMERCIAL

## 2018-10-23 ENCOUNTER — OFFICE VISIT (OUTPATIENT)
Dept: FAMILY MEDICINE | Facility: CLINIC | Age: 43
End: 2018-10-23
Payer: COMMERCIAL

## 2018-10-23 VITALS
DIASTOLIC BLOOD PRESSURE: 80 MMHG | OXYGEN SATURATION: 97 % | TEMPERATURE: 98 F | HEIGHT: 72 IN | SYSTOLIC BLOOD PRESSURE: 120 MMHG | WEIGHT: 271.19 LBS | BODY MASS INDEX: 36.73 KG/M2 | HEART RATE: 78 BPM

## 2018-10-23 DIAGNOSIS — I10 ESSENTIAL HYPERTENSION: ICD-10-CM

## 2018-10-23 DIAGNOSIS — Z02.0 SCHOOL PHYSICAL EXAM: ICD-10-CM

## 2018-10-23 DIAGNOSIS — Z23 NEED FOR TDAP VACCINATION: ICD-10-CM

## 2018-10-23 DIAGNOSIS — Z23 NEED FOR PROPHYLACTIC VACCINATION AND INOCULATION AGAINST INFLUENZA: ICD-10-CM

## 2018-10-23 DIAGNOSIS — Z11.1 SCREENING-PULMONARY TB: ICD-10-CM

## 2018-10-23 DIAGNOSIS — Z00.00 ANNUAL PHYSICAL EXAM: Primary | ICD-10-CM

## 2018-10-23 DIAGNOSIS — Z00.00 ANNUAL PHYSICAL EXAM: ICD-10-CM

## 2018-10-23 LAB
ALBUMIN SERPL BCP-MCNC: 4 G/DL
ALP SERPL-CCNC: 73 U/L
ALT SERPL W/O P-5'-P-CCNC: 58 U/L
ANION GAP SERPL CALC-SCNC: 8 MMOL/L
AST SERPL-CCNC: 24 U/L
BASOPHILS # BLD AUTO: 0.08 K/UL
BASOPHILS NFR BLD: 1.4 %
BILIRUB SERPL-MCNC: 0.6 MG/DL
BUN SERPL-MCNC: 18 MG/DL
CALCIUM SERPL-MCNC: 9.5 MG/DL
CHLORIDE SERPL-SCNC: 108 MMOL/L
CO2 SERPL-SCNC: 23 MMOL/L
CREAT SERPL-MCNC: 1 MG/DL
DIFFERENTIAL METHOD: ABNORMAL
EOSINOPHIL # BLD AUTO: 0.2 K/UL
EOSINOPHIL NFR BLD: 3.3 %
ERYTHROCYTE [DISTWIDTH] IN BLOOD BY AUTOMATED COUNT: 13.2 %
EST. GFR  (AFRICAN AMERICAN): >60 ML/MIN/1.73 M^2
EST. GFR  (NON AFRICAN AMERICAN): >60 ML/MIN/1.73 M^2
GLUCOSE SERPL-MCNC: 96 MG/DL
HCT VFR BLD AUTO: 46.9 %
HGB BLD-MCNC: 15.3 G/DL
IMM GRANULOCYTES # BLD AUTO: 0.04 K/UL
IMM GRANULOCYTES NFR BLD AUTO: 0.7 %
LYMPHOCYTES # BLD AUTO: 1.5 K/UL
LYMPHOCYTES NFR BLD: 26 %
MCH RBC QN AUTO: 29.9 PG
MCHC RBC AUTO-ENTMCNC: 32.6 G/DL
MCV RBC AUTO: 92 FL
MONOCYTES # BLD AUTO: 0.6 K/UL
MONOCYTES NFR BLD: 9.8 %
NEUTROPHILS # BLD AUTO: 3.4 K/UL
NEUTROPHILS NFR BLD: 58.8 %
NRBC BLD-RTO: 0 /100 WBC
PLATELET # BLD AUTO: 220 K/UL
PMV BLD AUTO: 11.1 FL
POTASSIUM SERPL-SCNC: 4.5 MMOL/L
PROT SERPL-MCNC: 7.4 G/DL
RBC # BLD AUTO: 5.11 M/UL
SODIUM SERPL-SCNC: 139 MMOL/L
WBC # BLD AUTO: 5.84 K/UL

## 2018-10-23 PROCEDURE — 86765 RUBEOLA ANTIBODY: CPT

## 2018-10-23 PROCEDURE — 86706 HEP B SURFACE ANTIBODY: CPT

## 2018-10-23 PROCEDURE — 90471 IMMUNIZATION ADMIN: CPT | Mod: S$GLB,,, | Performed by: FAMILY MEDICINE

## 2018-10-23 PROCEDURE — 90715 TDAP VACCINE 7 YRS/> IM: CPT | Mod: S$GLB,,, | Performed by: FAMILY MEDICINE

## 2018-10-23 PROCEDURE — 85025 COMPLETE CBC W/AUTO DIFF WBC: CPT

## 2018-10-23 PROCEDURE — 86735 MUMPS ANTIBODY: CPT

## 2018-10-23 PROCEDURE — 80053 COMPREHEN METABOLIC PANEL: CPT

## 2018-10-23 PROCEDURE — 3079F DIAST BP 80-89 MM HG: CPT | Mod: CPTII,S$GLB,, | Performed by: FAMILY MEDICINE

## 2018-10-23 PROCEDURE — 90686 IIV4 VACC NO PRSV 0.5 ML IM: CPT | Mod: S$GLB,,, | Performed by: FAMILY MEDICINE

## 2018-10-23 PROCEDURE — 86762 RUBELLA ANTIBODY: CPT

## 2018-10-23 PROCEDURE — 99999 PR PBB SHADOW E&M-EST. PATIENT-LVL III: CPT | Mod: PBBFAC,,, | Performed by: FAMILY MEDICINE

## 2018-10-23 PROCEDURE — 36415 COLL VENOUS BLD VENIPUNCTURE: CPT | Mod: PO

## 2018-10-23 PROCEDURE — 86787 VARICELLA-ZOSTER ANTIBODY: CPT

## 2018-10-23 PROCEDURE — 3074F SYST BP LT 130 MM HG: CPT | Mod: CPTII,S$GLB,, | Performed by: FAMILY MEDICINE

## 2018-10-23 PROCEDURE — 99396 PREV VISIT EST AGE 40-64: CPT | Mod: 25,S$GLB,, | Performed by: FAMILY MEDICINE

## 2018-10-23 PROCEDURE — 90472 IMMUNIZATION ADMIN EACH ADD: CPT | Mod: S$GLB,,, | Performed by: FAMILY MEDICINE

## 2018-10-23 PROCEDURE — 86580 TB INTRADERMAL TEST: CPT | Mod: S$GLB,,, | Performed by: FAMILY MEDICINE

## 2018-10-23 RX ORDER — NADOLOL 40 MG/1
TABLET ORAL
Qty: 90 TABLET | Refills: 3 | Status: SHIPPED | OUTPATIENT
Start: 2018-10-23 | End: 2019-10-29 | Stop reason: SDUPTHER

## 2018-10-23 NOTE — PROGRESS NOTES
Assessment & Plan  Problem List Items Addressed This Visit        Unprioritized    Hypertension    Relevant Medications    nadolol (CORGARD) 40 MG tablet    Other Relevant Orders    CBC auto differential    Comprehensive metabolic panel      Other Visit Diagnoses     Annual physical exam    -  Primary    Relevant Orders    Rubella antibody, IgG    Rubeola antibody IgG    Mumps, IgG Screen    Hepatitis B Surface Antibody, Qual/Quant    Varicella zoster antibody, IgG    CBC auto differential    Comprehensive metabolic panel    School physical exam        Relevant Orders    Rubella antibody, IgG    Rubeola antibody IgG    Mumps, IgG Screen    Hepatitis B Surface Antibody, Qual/Quant    Varicella zoster antibody, IgG    Need for prophylactic vaccination and inoculation against influenza        Relevant Orders    Influenza - Quadrivalent (3 years & older) (PF) (Completed)    Need for Tdap vaccination        Relevant Orders    (In Office Administered) Tdap Vaccine (Completed)    Screening-pulmonary TB        Relevant Orders    POCT TB Skin Test Read (Completed)      I addressed all major concerns as it related to health maintenance.  All were ordered and scheduled based on the patients wishes.  Any additional health maintenance will be readdressed at the next physical if declined or deferred by the patient.        Health Maintenance reviewed.    Follow-up: Follow-up in about 1 year (around 10/23/2019) for annual exam.    ______________________________________________________________________    Chief Complaint  Chief Complaint   Patient presents with    Annual Exam       HPI  Jaron Stein is a 43 y.o. male with multiple medical diagnoses as listed in the medical history and problem list that presents for annual exam.  Pt is known to me with last appointment 4/20/2016.      Patient denies any new symptoms including chest pain, SOB, blurry vision, N/V, diarrhea.  Patient requires records of his vaccinations.  We discussed  completing titers in order to determine if he is immune to several infections.  He denies any new issues at this time.        PAST MEDICAL HISTORY:  Past Medical History:   Diagnosis Date    Anxiety     Headache, migraine     Hypertension     Obesity        PAST SURGICAL HISTORY:  Past Surgical History:   Procedure Laterality Date    NO PAST SURGERIES      VASECTOMY         SOCIAL HISTORY:  Social History     Socioeconomic History    Marital status:      Spouse name: Not on file    Number of children: Not on file    Years of education: Not on file    Highest education level: Not on file   Social Needs    Financial resource strain: Not on file    Food insecurity - worry: Not on file    Food insecurity - inability: Not on file    Transportation needs - medical: Not on file    Transportation needs - non-medical: Not on file   Occupational History    Not on file   Tobacco Use    Smoking status: Former Smoker    Smokeless tobacco: Never Used    Tobacco comment: quit 6-7 years ago   Substance and Sexual Activity    Alcohol use: Yes     Comment: socially    Drug use: No    Sexual activity: Yes     Partners: Female   Other Topics Concern    Not on file   Social History Narrative    Not on file       FAMILY HISTORY:  Family History   Problem Relation Age of Onset    Diabetes Mother        ALLERGIES AND MEDICATIONS: updated and reviewed.  Review of patient's allergies indicates:  No Known Allergies  Current Outpatient Medications   Medication Sig Dispense Refill    azelastine (ASTELIN) 137 mcg (0.1 %) nasal spray 1 spray (137 mcg total) by Nasal route 2 (two) times daily. 30 mL 0    levocetirizine (XYZAL) 5 MG tablet Take 1 tablet (5 mg total) by mouth every evening. 30 tablet 11    nadolol (CORGARD) 40 MG tablet TAKE 1 TABLET(40 MG) BY MOUTH EVERY DAY 90 tablet 3    paroxetine (PAXIL) 20 MG tablet Take 1 tablet (20 mg total) by mouth every morning. Appt needed with PCP for future refills  90 tablet 0    ropinirole (REQUIP) 0.5 MG tablet TAKE 1 TO 2 TABLETS(0.5 TO 1 MG) BY MOUTH EVERY EVENING 60 tablet 5     No current facility-administered medications for this visit.          ROS  Review of Systems   Constitutional: Negative for activity change and unexpected weight change.   HENT: Negative for hearing loss, rhinorrhea and trouble swallowing.    Eyes: Negative for discharge and visual disturbance.   Respiratory: Negative for chest tightness and wheezing.    Cardiovascular: Negative for chest pain and palpitations.   Gastrointestinal: Negative for blood in stool, constipation, diarrhea and vomiting.   Endocrine: Negative for polydipsia and polyuria.   Genitourinary: Negative for difficulty urinating, hematuria and urgency.   Musculoskeletal: Negative for arthralgias, joint swelling and neck pain.   Neurological: Negative for weakness and headaches.   Psychiatric/Behavioral: Negative for confusion and dysphoric mood.           Physical Exam  Vitals:    10/23/18 0737   BP: 120/80   BP Location: Left arm   Patient Position: Sitting   BP Method: Large (Manual)   Pulse: 78   Temp: 98 °F (36.7 °C)   TempSrc: Oral   SpO2: 97%   Weight: 123 kg (271 lb 2.7 oz)   Height: 6' (1.829 m)    Body mass index is 36.78 kg/m².  Weight: 123 kg (271 lb 2.7 oz)   Height: 6' (182.9 cm)   Physical Exam   Constitutional: He is oriented to person, place, and time. He appears well-developed and well-nourished.   HENT:   Head: Normocephalic and atraumatic.   Right Ear: External ear normal.   Left Ear: External ear normal.   Nose: Nose normal.   Mouth/Throat: Oropharynx is clear and moist. No oropharyngeal exudate.   Eyes: Conjunctivae and EOM are normal. Pupils are equal, round, and reactive to light.   Neck: Normal range of motion. Neck supple.   Cardiovascular: Normal rate, regular rhythm, normal heart sounds and intact distal pulses. Exam reveals no gallop and no friction rub.   No murmur heard.  Pulmonary/Chest: Effort  normal and breath sounds normal.   Abdominal: Soft. Bowel sounds are normal.   Musculoskeletal: Normal range of motion.   Neurological: He is alert and oriented to person, place, and time. He has normal reflexes.   Skin: Skin is warm and dry.   Psychiatric: He has a normal mood and affect. His behavior is normal. Thought content normal.   Vitals reviewed.        Health Maintenance       Date Due Completion Date    TETANUS VACCINE 11/30/2017 11/30/2007    Influenza Vaccine 08/01/2018 11/26/2014    Lipid Panel 05/15/2022 5/15/2017

## 2018-10-24 LAB
MUMPS IGG INTERPRETATION: POSITIVE
MUMPS IGG SCREEN: 1.89 ISR
RUBEOLA IGG ANTIBODY: 2.03 ISR
RUBEOLA INTERPRETATION: POSITIVE
RUBV IGG SER-ACNC: 214 IU/ML
RUBV IGG SER-IMP: REACTIVE
VARICELLA INTERPRETATION: NEGATIVE
VARICELLA ZOSTER IGG: 0.2 ISR

## 2018-10-25 ENCOUNTER — CLINICAL SUPPORT (OUTPATIENT)
Dept: FAMILY MEDICINE | Facility: CLINIC | Age: 43
End: 2018-10-25
Payer: COMMERCIAL

## 2018-10-25 DIAGNOSIS — Z11.1 ENCOUNTER FOR PPD SKIN TEST READING: Primary | ICD-10-CM

## 2018-10-25 LAB
HEP. B SURF AB, QUAL: NEGATIVE
HEP. B SURF AB, QUANT.: <3 MIU/ML
TB INDURATION - 48 HR READ: 0 MM
TB INDURATION - 72 HR READ: NORMAL MM
TB SKIN TEST - 48 HR READ: NEGATIVE
TB SKIN TEST - 72 HR READ: NORMAL

## 2018-10-25 PROCEDURE — 99499 UNLISTED E&M SERVICE: CPT | Mod: S$GLB,,, | Performed by: FAMILY MEDICINE

## 2018-10-31 ENCOUNTER — PATIENT MESSAGE (OUTPATIENT)
Dept: FAMILY MEDICINE | Facility: CLINIC | Age: 43
End: 2018-10-31

## 2018-11-03 DIAGNOSIS — F41.9 ANXIETY: ICD-10-CM

## 2018-11-04 RX ORDER — PAROXETINE HYDROCHLORIDE 20 MG/1
TABLET, FILM COATED ORAL
Qty: 90 TABLET | Refills: 0 | Status: SHIPPED | OUTPATIENT
Start: 2018-11-04 | End: 2019-01-21 | Stop reason: SDUPTHER

## 2018-12-27 RX ORDER — ROPINIROLE 1 MG/1
TABLET, FILM COATED ORAL
Qty: 90 TABLET | Refills: 3 | Status: SHIPPED | OUTPATIENT
Start: 2018-12-27 | End: 2019-06-10 | Stop reason: SDUPTHER

## 2019-01-12 ENCOUNTER — OFFICE VISIT (OUTPATIENT)
Dept: URGENT CARE | Facility: CLINIC | Age: 44
End: 2019-01-12
Payer: COMMERCIAL

## 2019-01-12 VITALS
SYSTOLIC BLOOD PRESSURE: 123 MMHG | TEMPERATURE: 98 F | HEIGHT: 72 IN | DIASTOLIC BLOOD PRESSURE: 91 MMHG | OXYGEN SATURATION: 97 % | RESPIRATION RATE: 17 BRPM | HEART RATE: 88 BPM | BODY MASS INDEX: 36.57 KG/M2 | WEIGHT: 270 LBS

## 2019-01-12 DIAGNOSIS — H10.32 ACUTE BACTERIAL CONJUNCTIVITIS OF LEFT EYE: Primary | ICD-10-CM

## 2019-01-12 DIAGNOSIS — H57.89 IRRITATION OF LEFT EYE: ICD-10-CM

## 2019-01-12 PROCEDURE — 99214 PR OFFICE/OUTPT VISIT, EST, LEVL IV, 30-39 MIN: ICD-10-PCS | Mod: S$GLB,,, | Performed by: NURSE PRACTITIONER

## 2019-01-12 PROCEDURE — 3080F DIAST BP >= 90 MM HG: CPT | Mod: CPTII,S$GLB,, | Performed by: NURSE PRACTITIONER

## 2019-01-12 PROCEDURE — 3080F PR MOST RECENT DIASTOLIC BLOOD PRESSURE >= 90 MM HG: ICD-10-PCS | Mod: CPTII,S$GLB,, | Performed by: NURSE PRACTITIONER

## 2019-01-12 PROCEDURE — 3008F PR BODY MASS INDEX (BMI) DOCUMENTED: ICD-10-PCS | Mod: CPTII,S$GLB,, | Performed by: NURSE PRACTITIONER

## 2019-01-12 PROCEDURE — 3074F PR MOST RECENT SYSTOLIC BLOOD PRESSURE < 130 MM HG: ICD-10-PCS | Mod: CPTII,S$GLB,, | Performed by: NURSE PRACTITIONER

## 2019-01-12 PROCEDURE — 99214 OFFICE O/P EST MOD 30 MIN: CPT | Mod: S$GLB,,, | Performed by: NURSE PRACTITIONER

## 2019-01-12 PROCEDURE — 3008F BODY MASS INDEX DOCD: CPT | Mod: CPTII,S$GLB,, | Performed by: NURSE PRACTITIONER

## 2019-01-12 PROCEDURE — 3074F SYST BP LT 130 MM HG: CPT | Mod: CPTII,S$GLB,, | Performed by: NURSE PRACTITIONER

## 2019-01-12 RX ORDER — POLYMYXIN B SULFATE AND TRIMETHOPRIM 1; 10000 MG/ML; [USP'U]/ML
1 SOLUTION OPHTHALMIC EVERY 6 HOURS
Qty: 1.8667 ML | Refills: 0 | Status: SHIPPED | OUTPATIENT
Start: 2019-01-12 | End: 2019-01-19

## 2019-01-12 RX ORDER — NADOLOL 40 MG/1
TABLET ORAL
COMMUNITY
Start: 2018-10-17 | End: 2020-07-21 | Stop reason: SDUPTHER

## 2019-01-12 NOTE — PATIENT INSTRUCTIONS
What Is Conjunctivitis?    Conjunctivitis is an irritation or infection. It affects the membrane that covers the white of your eye and the inside of your eyelid (conjunctiva). It can happen to one or both eyes. The membrane swells and the blood vessels enlarge (dilate). This makes your eye red. That's why conjunctivitis is sometimes called red eye or pink eye.  What are the symptoms?  If you have one or more of these symptoms, see an eye doctor:  · Redness in and around your eye  · Eyes that are puffy and sore  · Itching, burning, or stinging eyes  · Watery eyes or discharge from your eye  · Eyelids that are crusty or stuck together when you wake up in the morning  · Pink color in the whites of one or both eyes  Getting treatment quickly can help prevent damage to your eyes.  How is it diagnosed?  Conjunctivitis is usually a minor eye infection. But it can sometimes become a more serious problem. Some more serious eye diseases have symptoms that look like conjunctivitis. So it's important for an eye doctor to diagnose you. Your eye doctor will ask about your symptoms and any medicines you take. He or she will ask about any illnesses or medical conditions you may have. The doctor will also check your eyes with a hand-held light and a special microscope called a slit lamp.  Date Last Reviewed: 6/11/2015  © 4896-0878 [employer]. [address] [place] [address], [place] [address]. All rights reserved. This information is not intended as a substitute for professional medical care. Always follow your healthcare professional's instructions.

## 2019-01-12 NOTE — PROGRESS NOTES
Subjective:       Patient ID: Jaron Stein is a 43 y.o. male.    Vitals:  height is 6' (1.829 m) and weight is 122.5 kg (270 lb). His temperature is 97.9 °F (36.6 °C). His blood pressure is 123/91 (abnormal) and his pulse is 88. His respiration is 17 and oxygen saturation is 97%.     Chief Complaint: Eye Problem    Pt noticed the sensation last night when taking contacts out. Pt has not tried to wash his eye out. Pt denies decrease in visual acuity.       Eye Problem    The left eye is affected. This is a new problem. The current episode started yesterday. The problem occurs constantly. The problem has been gradually worsening. The injury mechanism was contact lenses. The pain is at a severity of 5/10. The pain is moderate. There is no known exposure to pink eye. He wears contacts. Associated symptoms include an eye discharge, eye redness and itching. Pertinent negatives include no blurred vision, double vision, fever, nausea, photophobia or vomiting. He has tried eye drops (intensive care visine) for the symptoms. The treatment provided no relief.       Constitution: Negative for chills and fever.   HENT: Negative for congestion and sinus pain.    Eyes: Positive for foreign body in eye, eye discharge, eye itching, eye redness and eyelid swelling. Negative for eye trauma, eye pain, photophobia, vision loss, double vision and blurred vision.   Gastrointestinal: Negative for nausea and vomiting.   Skin: Negative for rash.   Allergic/Immunologic: Negative for seasonal allergies and itching.   Neurological: Negative for headaches.       Objective:      Physical Exam   Constitutional: He is oriented to person, place, and time. He appears well-developed and well-nourished.   HENT:   Head: Normocephalic and atraumatic.   Right Ear: External ear normal.   Left Ear: External ear normal.   Nose: Nose normal.   Mouth/Throat: Oropharynx is clear and moist.   Eyes: EOM and lids are normal. Pupils are equal, round, and reactive  to light. Lids are everted and swept, no foreign bodies found. Left eye exhibits no discharge and no exudate. No foreign body present in the left eye. Left conjunctiva is injected.   Washed out with sterile water, pt reports pain gone after that   Neck: Trachea normal, full passive range of motion without pain and phonation normal. Neck supple.   Musculoskeletal: Normal range of motion.   Neurological: He is alert and oriented to person, place, and time.   Skin: Skin is warm, dry and intact.   Psychiatric: He has a normal mood and affect. His speech is normal and behavior is normal. Judgment and thought content normal. Cognition and memory are normal.   Nursing note and vitals reviewed.      Assessment:       1. Acute bacterial conjunctivitis of left eye    2. Irritation of left eye        Plan:         Acute bacterial conjunctivitis of left eye  -     polymyxin B sulf-trimethoprim (POLYTRIM) 10,000 unit- 1 mg/mL Drop; Place 1 drop into the left eye every 6 (six) hours. for 7 days  Dispense: 1.8667 mL; Refill: 0    Irritation of left eye  -     polymyxin B sulf-trimethoprim (POLYTRIM) 10,000 unit- 1 mg/mL Drop; Place 1 drop into the left eye every 6 (six) hours. for 7 days  Dispense: 1.8667 mL; Refill: 0      Patient Instructions     What Is Conjunctivitis?    Conjunctivitis is an irritation or infection. It affects the membrane that covers the white of your eye and the inside of your eyelid (conjunctiva). It can happen to one or both eyes. The membrane swells and the blood vessels enlarge (dilate). This makes your eye red. That's why conjunctivitis is sometimes called red eye or pink eye.  What are the symptoms?  If you have one or more of these symptoms, see an eye doctor:  · Redness in and around your eye  · Eyes that are puffy and sore  · Itching, burning, or stinging eyes  · Watery eyes or discharge from your eye  · Eyelids that are crusty or stuck together when you wake up in the morning  · Pink color in the  whites of one or both eyes  Getting treatment quickly can help prevent damage to your eyes.  How is it diagnosed?  Conjunctivitis is usually a minor eye infection. But it can sometimes become a more serious problem. Some more serious eye diseases have symptoms that look like conjunctivitis. So it's important for an eye doctor to diagnose you. Your eye doctor will ask about your symptoms and any medicines you take. He or she will ask about any illnesses or medical conditions you may have. The doctor will also check your eyes with a hand-held light and a special microscope called a slit lamp.  Date Last Reviewed: 6/11/2015  © 0377-9888 [employer]. [address] [place] [address], [place] [address]. All rights reserved. This information is not intended as a substitute for professional medical care. Always follow your healthcare professional's instructions.

## 2019-01-21 DIAGNOSIS — F41.9 ANXIETY: ICD-10-CM

## 2019-01-22 RX ORDER — PAROXETINE HYDROCHLORIDE 20 MG/1
TABLET, FILM COATED ORAL
Qty: 90 TABLET | Refills: 0 | Status: SHIPPED | OUTPATIENT
Start: 2019-01-22 | End: 2019-06-10 | Stop reason: SDUPTHER

## 2019-06-10 ENCOUNTER — PATIENT MESSAGE (OUTPATIENT)
Dept: FAMILY MEDICINE | Facility: CLINIC | Age: 44
End: 2019-06-10

## 2019-06-10 DIAGNOSIS — F41.9 ANXIETY: ICD-10-CM

## 2019-06-10 RX ORDER — PAROXETINE HYDROCHLORIDE 20 MG/1
20 TABLET, FILM COATED ORAL EVERY MORNING
Qty: 90 TABLET | Refills: 1 | Status: SHIPPED | OUTPATIENT
Start: 2019-06-10 | End: 2020-02-20 | Stop reason: SDUPTHER

## 2019-06-10 RX ORDER — ROPINIROLE 1 MG/1
1 TABLET, FILM COATED ORAL NIGHTLY
Qty: 90 TABLET | Refills: 3 | Status: SHIPPED | OUTPATIENT
Start: 2019-06-10 | End: 2020-06-15

## 2019-10-29 DIAGNOSIS — I10 ESSENTIAL HYPERTENSION: ICD-10-CM

## 2019-10-29 RX ORDER — NADOLOL 40 MG/1
TABLET ORAL
Qty: 90 TABLET | Refills: 0 | Status: SHIPPED | OUTPATIENT
Start: 2019-10-29 | End: 2020-03-28

## 2019-12-09 NOTE — PATIENT INSTRUCTIONS
Claritin(loratadine), Allegra(fexofenadine), or zyrtec(cetirizine) for runny nose and congestion.   Delsym for cough.     
ambulatory

## 2020-02-01 DIAGNOSIS — F41.9 ANXIETY: ICD-10-CM

## 2020-02-03 RX ORDER — PAROXETINE HYDROCHLORIDE 20 MG/1
TABLET, FILM COATED ORAL
Qty: 90 TABLET | Refills: 1 | OUTPATIENT
Start: 2020-02-03

## 2020-02-20 DIAGNOSIS — F41.9 ANXIETY: ICD-10-CM

## 2020-02-20 RX ORDER — PAROXETINE HYDROCHLORIDE 20 MG/1
20 TABLET, FILM COATED ORAL EVERY MORNING
Qty: 90 TABLET | Refills: 1 | Status: SHIPPED | OUTPATIENT
Start: 2020-02-20 | End: 2020-07-21 | Stop reason: SDUPTHER

## 2020-02-21 DIAGNOSIS — I10 HYPERTENSION: ICD-10-CM

## 2020-03-28 DIAGNOSIS — I10 ESSENTIAL HYPERTENSION: ICD-10-CM

## 2020-03-28 RX ORDER — NADOLOL 40 MG/1
TABLET ORAL
Qty: 30 TABLET | Refills: 0 | Status: SHIPPED | OUTPATIENT
Start: 2020-03-28 | End: 2020-03-29

## 2020-03-29 DIAGNOSIS — I10 ESSENTIAL HYPERTENSION: ICD-10-CM

## 2020-03-29 RX ORDER — NADOLOL 40 MG/1
TABLET ORAL
Qty: 30 TABLET | Refills: 0 | Status: SHIPPED | OUTPATIENT
Start: 2020-03-29 | End: 2020-06-04 | Stop reason: SDUPTHER

## 2020-05-05 ENCOUNTER — TELEPHONE (OUTPATIENT)
Dept: FAMILY MEDICINE | Facility: CLINIC | Age: 45
End: 2020-05-05

## 2020-05-07 DIAGNOSIS — I10 ESSENTIAL HYPERTENSION: ICD-10-CM

## 2020-05-07 RX ORDER — NADOLOL 40 MG/1
TABLET ORAL
Qty: 30 TABLET | Refills: 0 | OUTPATIENT
Start: 2020-05-07

## 2020-05-19 DIAGNOSIS — I10 ESSENTIAL HYPERTENSION: ICD-10-CM

## 2020-05-19 RX ORDER — NADOLOL 40 MG/1
TABLET ORAL
Qty: 30 TABLET | Refills: 0 | OUTPATIENT
Start: 2020-05-19

## 2020-05-19 NOTE — TELEPHONE ENCOUNTER
Called patient and no answer a message was left for him to call us back at the clinic and schedule appointment for his refills.

## 2020-05-19 NOTE — TELEPHONE ENCOUNTER
Care Due:                  Date            Visit Type   Department     Provider  --------------------------------------------------------------------------------    Last Visit: None Found      None         None Found  Next Visit: None Scheduled  None         None Found                                                            Last  Test          Frequency    Reason                     Performed    Due Date  --------------------------------------------------------------------------------    Office Visit  6 months...  paroxetine, rOPINIRole...  Not Found    Overdue    Powered by [employer]. Reference number: [id number]. 5/19/2020 8:18:46 AM KIMT

## 2020-06-04 ENCOUNTER — PATIENT MESSAGE (OUTPATIENT)
Dept: FAMILY MEDICINE | Facility: CLINIC | Age: 45
End: 2020-06-04

## 2020-06-04 DIAGNOSIS — I10 ESSENTIAL HYPERTENSION: ICD-10-CM

## 2020-06-04 RX ORDER — NADOLOL 40 MG/1
40 TABLET ORAL DAILY
Qty: 30 TABLET | Refills: 1 | Status: SHIPPED | OUTPATIENT
Start: 2020-06-04 | End: 2020-06-04

## 2020-07-21 ENCOUNTER — OFFICE VISIT (OUTPATIENT)
Dept: FAMILY MEDICINE | Facility: CLINIC | Age: 45
End: 2020-07-21
Payer: COMMERCIAL

## 2020-07-21 VITALS
DIASTOLIC BLOOD PRESSURE: 82 MMHG | WEIGHT: 275.81 LBS | HEART RATE: 74 BPM | SYSTOLIC BLOOD PRESSURE: 120 MMHG | BODY MASS INDEX: 37.36 KG/M2 | TEMPERATURE: 98 F | HEIGHT: 72 IN | OXYGEN SATURATION: 98 %

## 2020-07-21 DIAGNOSIS — G25.81 RESTLESS LEG SYNDROME: ICD-10-CM

## 2020-07-21 DIAGNOSIS — I10 ESSENTIAL HYPERTENSION: ICD-10-CM

## 2020-07-21 DIAGNOSIS — I49.9 CARDIAC ARRHYTHMIA, UNSPECIFIED CARDIAC ARRHYTHMIA TYPE: ICD-10-CM

## 2020-07-21 DIAGNOSIS — F41.9 ANXIETY: ICD-10-CM

## 2020-07-21 DIAGNOSIS — Z00.00 ANNUAL PHYSICAL EXAM: Primary | ICD-10-CM

## 2020-07-21 PROCEDURE — 3074F SYST BP LT 130 MM HG: CPT | Mod: CPTII,S$GLB,, | Performed by: FAMILY MEDICINE

## 2020-07-21 PROCEDURE — 99999 PR PBB SHADOW E&M-EST. PATIENT-LVL III: ICD-10-PCS | Mod: PBBFAC,,, | Performed by: FAMILY MEDICINE

## 2020-07-21 PROCEDURE — 93010 EKG 12-LEAD: ICD-10-PCS | Mod: S$GLB,,, | Performed by: INTERNAL MEDICINE

## 2020-07-21 PROCEDURE — 99999 PR PBB SHADOW E&M-EST. PATIENT-LVL III: CPT | Mod: PBBFAC,,, | Performed by: FAMILY MEDICINE

## 2020-07-21 PROCEDURE — 93005 EKG 12-LEAD: ICD-10-PCS | Mod: S$GLB,,, | Performed by: FAMILY MEDICINE

## 2020-07-21 PROCEDURE — 99396 PREV VISIT EST AGE 40-64: CPT | Mod: S$GLB,,, | Performed by: FAMILY MEDICINE

## 2020-07-21 PROCEDURE — 93005 ELECTROCARDIOGRAM TRACING: CPT | Mod: S$GLB,,, | Performed by: FAMILY MEDICINE

## 2020-07-21 PROCEDURE — 3008F PR BODY MASS INDEX (BMI) DOCUMENTED: ICD-10-PCS | Mod: CPTII,S$GLB,, | Performed by: FAMILY MEDICINE

## 2020-07-21 PROCEDURE — 3079F DIAST BP 80-89 MM HG: CPT | Mod: CPTII,S$GLB,, | Performed by: FAMILY MEDICINE

## 2020-07-21 PROCEDURE — 99396 PR PREVENTIVE VISIT,EST,40-64: ICD-10-PCS | Mod: S$GLB,,, | Performed by: FAMILY MEDICINE

## 2020-07-21 PROCEDURE — 3074F PR MOST RECENT SYSTOLIC BLOOD PRESSURE < 130 MM HG: ICD-10-PCS | Mod: CPTII,S$GLB,, | Performed by: FAMILY MEDICINE

## 2020-07-21 PROCEDURE — 3079F PR MOST RECENT DIASTOLIC BLOOD PRESSURE 80-89 MM HG: ICD-10-PCS | Mod: CPTII,S$GLB,, | Performed by: FAMILY MEDICINE

## 2020-07-21 PROCEDURE — 93010 ELECTROCARDIOGRAM REPORT: CPT | Mod: S$GLB,,, | Performed by: INTERNAL MEDICINE

## 2020-07-21 PROCEDURE — 3008F BODY MASS INDEX DOCD: CPT | Mod: CPTII,S$GLB,, | Performed by: FAMILY MEDICINE

## 2020-07-21 RX ORDER — ROPINIROLE 2 MG/1
TABLET, FILM COATED ORAL
Qty: 90 TABLET | Refills: 3 | Status: SHIPPED | OUTPATIENT
Start: 2020-07-21 | End: 2020-07-21 | Stop reason: SDUPTHER

## 2020-07-21 RX ORDER — PAROXETINE HYDROCHLORIDE 20 MG/1
20 TABLET, FILM COATED ORAL EVERY MORNING
Qty: 90 TABLET | Refills: 3 | Status: SHIPPED | OUTPATIENT
Start: 2020-07-21 | End: 2022-06-07 | Stop reason: SDUPTHER

## 2020-07-21 RX ORDER — NADOLOL 40 MG/1
TABLET ORAL
Qty: 90 TABLET | Refills: 3 | Status: SHIPPED | OUTPATIENT
Start: 2020-07-21 | End: 2022-06-07 | Stop reason: SDUPTHER

## 2020-07-21 RX ORDER — ROPINIROLE 1 MG/1
TABLET, FILM COATED ORAL
Qty: 90 TABLET | Refills: 3 | Status: SHIPPED | OUTPATIENT
Start: 2020-07-21 | End: 2020-07-21 | Stop reason: SDUPTHER

## 2020-07-21 RX ORDER — ROPINIROLE 2 MG/1
TABLET, FILM COATED ORAL
Qty: 90 TABLET | Refills: 3 | Status: SHIPPED | OUTPATIENT
Start: 2020-07-21 | End: 2020-07-21

## 2020-07-21 NOTE — PROGRESS NOTES
Assessment & Plan  Problem List Items Addressed This Visit        Psychiatric    Anxiety    Relevant Medications    paroxetine (PAXIL) 20 MG tablet       Cardiac/Vascular    Hypertension    Relevant Medications    nadoloL (CORGARD) 40 MG tablet      Other Visit Diagnoses     Annual physical exam    -  Primary    Relevant Orders    Comprehensive metabolic panel    CBC auto differential    Hemoglobin A1C    Lipid Panel    TSH    Restless leg syndrome        Relevant Medications    rOPINIRole (REQUIP) 2 MG tablet    Cardiac arrhythmia, unspecified cardiac arrhythmia type        Relevant Orders    EKG 12-lead (Completed)        I addressed all major concerns as it related to health maintenance.  All were ordered and scheduled based on the patients wishes.  Any additional health maintenance will be readdressed at the next physical if declined or deferred by the patient.      Health Maintenance reviewed.    Follow-up: Follow up in about 1 year (around 7/21/2021) for annual exam.    ______________________________________________________________________    Chief Complaint  Chief Complaint   Patient presents with    Annual Exam       HPI  Jaron Stein is a 45 y.o. male with multiple medical diagnoses as listed in the medical history and problem list that presents for annual exam.  Pt is known to me with last appointment 10/23/2018.    Patient denies any new symptoms including chest pain, SOB, blurry vision, N/V, diarrhea.  Patient does report that he has had noted abnormal heart rates.  Patient states that it will occur for a few seconds.  Patient states is infrequent.  He has noted increased stress with recent events related to COVID-19.  Patient states that is slightly improved.  He still reports increased anxiety.  He will have 1 bad day every 2 weeks.      PAST MEDICAL HISTORY:  Past Medical History:   Diagnosis Date    Anxiety     Headache, migraine     Hypertension     Obesity        PAST SURGICAL  HISTORY:  Past Surgical History:   Procedure Laterality Date    NO PAST SURGERIES      VASECTOMY         SOCIAL HISTORY:  Social History     Socioeconomic History    Marital status:      Spouse name: Not on file    Number of children: Not on file    Years of education: Not on file    Highest education level: Not on file   Occupational History    Not on file   Social Needs    Financial resource strain: Not very hard    Food insecurity     Worry: Never true     Inability: Never true    Transportation needs     Medical: No     Non-medical: No   Tobacco Use    Smoking status: Former Smoker    Smokeless tobacco: Never Used    Tobacco comment: quit 6-7 years ago   Substance and Sexual Activity    Alcohol use: Yes     Frequency: Monthly or less     Drinks per session: 3 or 4     Binge frequency: Less than monthly     Comment: socially    Drug use: No    Sexual activity: Yes     Partners: Female   Lifestyle    Physical activity     Days per week: 1 day     Minutes per session: 20 min    Stress: Rather much   Relationships    Social connections     Talks on phone: Once a week     Gets together: Once a week     Attends Zoroastrian service: Not on file     Active member of club or organization: Yes     Attends meetings of clubs or organizations: More than 4 times per year     Relationship status:    Other Topics Concern    Not on file   Social History Narrative    Not on file       FAMILY HISTORY:  Family History   Problem Relation Age of Onset    Diabetes Mother        ALLERGIES AND MEDICATIONS: updated and reviewed.  Review of patient's allergies indicates:  No Known Allergies  Current Outpatient Medications   Medication Sig Dispense Refill    nadoloL (CORGARD) 40 MG tablet TAKE 1 TABLET(40 MG) BY MOUTH EVERY DAY 90 tablet 3    paroxetine (PAXIL) 20 MG tablet Take 1 tablet (20 mg total) by mouth every morning. 90 tablet 3    rOPINIRole (REQUIP) 2 MG tablet TAKE 1 TABLET(1 MG) BY MOUTH  EVERY EVENING 90 tablet 3     No current facility-administered medications for this visit.          ROS  Review of Systems   Constitutional: Negative for activity change and unexpected weight change.   HENT: Negative for hearing loss, rhinorrhea and trouble swallowing.    Eyes: Negative for discharge and visual disturbance.   Respiratory: Negative for chest tightness and wheezing.    Cardiovascular: Positive for palpitations. Negative for chest pain.   Gastrointestinal: Negative for blood in stool, constipation, diarrhea and vomiting.   Endocrine: Negative for polydipsia and polyuria.   Genitourinary: Negative for difficulty urinating, hematuria and urgency.   Musculoskeletal: Positive for arthralgias. Negative for joint swelling and neck pain.   Neurological: Negative for weakness and headaches.   Psychiatric/Behavioral: Positive for dysphoric mood. Negative for confusion.           Physical Exam  Vitals:    07/21/20 0749   BP: 120/82   BP Location: Left arm   Patient Position: Sitting   BP Method: Large (Manual)   Pulse: 74   Temp: 97.7 °F (36.5 °C)   TempSrc: Tympanic   SpO2: 98%   Weight: 125.1 kg (275 lb 12.7 oz)   Height: 6' (1.829 m)    Body mass index is 37.4 kg/m².  Weight: 125.1 kg (275 lb 12.7 oz)   Height: 6' (182.9 cm)   Physical Exam  Vitals signs reviewed.   Constitutional:       Appearance: He is well-developed.   HENT:      Head: Normocephalic and atraumatic.      Right Ear: External ear normal.      Left Ear: External ear normal.      Nose: Nose normal.   Eyes:      Conjunctiva/sclera: Conjunctivae normal.      Pupils: Pupils are equal, round, and reactive to light.   Neck:      Musculoskeletal: Normal range of motion.   Cardiovascular:      Rate and Rhythm: Normal rate and regular rhythm.      Heart sounds: Normal heart sounds.   Pulmonary:      Effort: Pulmonary effort is normal.      Breath sounds: Normal breath sounds.   Skin:     General: Skin is warm and dry.   Neurological:      Mental  Status: He is alert and oriented to person, place, and time.   Psychiatric:         Behavior: Behavior normal.           Health Maintenance       Date Due Completion Date    HIV Screening [birth year]1990 ---    Influenza Vaccine (1) 09/01/2020 10/23/2018    Lipid Panel 05/15/2022 5/15/2017    TETANUS VACCINE 10/23/2028 10/23/2018              Patient note was created using [employer].  Any errors in syntax or even information may not have been identified and edited on initial review prior to signing this note.

## 2020-08-14 DIAGNOSIS — Z11.59 NEED FOR HEPATITIS C SCREENING TEST: ICD-10-CM

## 2021-07-23 ENCOUNTER — PATIENT MESSAGE (OUTPATIENT)
Dept: FAMILY MEDICINE | Facility: CLINIC | Age: 46
End: 2021-07-23

## 2021-07-23 RX ORDER — ROPINIROLE 2 MG/1
TABLET, FILM COATED ORAL
COMMUNITY
Start: 2021-06-02

## 2021-07-23 RX ORDER — ROPINIROLE 2 MG/1
2 TABLET, FILM COATED ORAL NIGHTLY
Qty: 90 TABLET | Refills: 3 | OUTPATIENT
Start: 2021-07-23 | End: 2021-10-21

## 2021-08-03 ENCOUNTER — PATIENT MESSAGE (OUTPATIENT)
Dept: FAMILY MEDICINE | Facility: CLINIC | Age: 46
End: 2021-08-03

## 2021-12-08 DIAGNOSIS — I10 HYPERTENSION: ICD-10-CM

## 2021-12-08 DIAGNOSIS — Z11.59 NEED FOR HEPATITIS C SCREENING TEST: ICD-10-CM

## 2022-02-22 ENCOUNTER — OFFICE VISIT (OUTPATIENT)
Dept: URGENT CARE | Facility: CLINIC | Age: 47
End: 2022-02-22
Payer: COMMERCIAL

## 2022-02-22 VITALS
OXYGEN SATURATION: 97 % | TEMPERATURE: 99 F | SYSTOLIC BLOOD PRESSURE: 150 MMHG | HEART RATE: 95 BPM | WEIGHT: 275 LBS | RESPIRATION RATE: 16 BRPM | BODY MASS INDEX: 37.25 KG/M2 | DIASTOLIC BLOOD PRESSURE: 94 MMHG | HEIGHT: 72 IN

## 2022-02-22 DIAGNOSIS — I10 HYPERTENSION, UNSPECIFIED TYPE: ICD-10-CM

## 2022-02-22 DIAGNOSIS — J02.9 SORE THROAT: Primary | ICD-10-CM

## 2022-02-22 LAB
CTP QC/QA: YES
CTP QC/QA: YES
MOLECULAR STREP A: NEGATIVE
SARS-COV-2 RDRP RESP QL NAA+PROBE: NEGATIVE

## 2022-02-22 PROCEDURE — 87651 STREP A DNA AMP PROBE: CPT | Mod: QW,S$GLB,,

## 2022-02-22 PROCEDURE — 99203 OFFICE O/P NEW LOW 30 MIN: CPT | Mod: S$GLB,,,

## 2022-02-22 PROCEDURE — 1160F PR REVIEW ALL MEDS BY PRESCRIBER/CLIN PHARMACIST DOCUMENTED: ICD-10-PCS | Mod: CPTII,S$GLB,,

## 2022-02-22 PROCEDURE — 1160F RVW MEDS BY RX/DR IN RCRD: CPT | Mod: CPTII,S$GLB,,

## 2022-02-22 PROCEDURE — 3008F BODY MASS INDEX DOCD: CPT | Mod: CPTII,S$GLB,,

## 2022-02-22 PROCEDURE — U0002: ICD-10-PCS | Mod: QW,S$GLB,,

## 2022-02-22 PROCEDURE — 1159F MED LIST DOCD IN RCRD: CPT | Mod: CPTII,S$GLB,,

## 2022-02-22 PROCEDURE — 3077F PR MOST RECENT SYSTOLIC BLOOD PRESSURE >= 140 MM HG: ICD-10-PCS | Mod: CPTII,S$GLB,,

## 2022-02-22 PROCEDURE — 99203 PR OFFICE/OUTPT VISIT, NEW, LEVL III, 30-44 MIN: ICD-10-PCS | Mod: S$GLB,,,

## 2022-02-22 PROCEDURE — 3080F DIAST BP >= 90 MM HG: CPT | Mod: CPTII,S$GLB,,

## 2022-02-22 PROCEDURE — 3080F PR MOST RECENT DIASTOLIC BLOOD PRESSURE >= 90 MM HG: ICD-10-PCS | Mod: CPTII,S$GLB,,

## 2022-02-22 PROCEDURE — 1159F PR MEDICATION LIST DOCUMENTED IN MEDICAL RECORD: ICD-10-PCS | Mod: CPTII,S$GLB,,

## 2022-02-22 PROCEDURE — 87651 POCT STREP A MOLECULAR: ICD-10-PCS | Mod: QW,S$GLB,,

## 2022-02-22 PROCEDURE — U0002 COVID-19 LAB TEST NON-CDC: HCPCS | Mod: QW,S$GLB,,

## 2022-02-22 PROCEDURE — 3077F SYST BP >= 140 MM HG: CPT | Mod: CPTII,S$GLB,,

## 2022-02-22 PROCEDURE — 3008F PR BODY MASS INDEX (BMI) DOCUMENTED: ICD-10-PCS | Mod: CPTII,S$GLB,,

## 2022-02-22 RX ORDER — NADOLOL 40 MG/1
40 TABLET ORAL DAILY
Qty: 30 TABLET | Refills: 0 | Status: SHIPPED | OUTPATIENT
Start: 2022-02-22 | End: 2023-02-22

## 2022-02-22 NOTE — PROGRESS NOTES
Subjective:       Patient ID: Jaron Stein is a 47 y.o. male.    Vitals:  height is 6' (1.829 m) and weight is 124.7 kg (275 lb). His temperature is 98.6 °F (37 °C). His blood pressure is 150/94 (abnormal) and his pulse is 95. His respiration is 16 and oxygen saturation is 97%.     Chief Complaint: Sore Throat (Entered by patient)    Pt is a 48 y/o male with a hx of HTN and LISA who presents with a 2-week hx of sore throat. Pt reports that he initially had congestion, PND, rhinorrhea, and a mild scratchy throat 2 weeks ago. He took nyquil and sinus symptoms had improved. He reports that the sore throat became worse today. COVID vaccinated without booster. No known COVID exposure. He does report that his son has been having similar symptoms but tested negative for COVID. Denies any fever, chills, SOB, CP, dizziness, lightheadedness, headaches, body aches. Pt reports that he is also out of BP medications.    Sore Throat   This is a new problem. The current episode started today. The problem has been gradually worsening. There has been no fever. The pain is at a severity of 5/10. Pertinent negatives include no congestion, coughing, diarrhea, ear pain, headaches, neck pain, shortness of breath or vomiting. He has tried acetaminophen (Nyquil) for the symptoms. The treatment provided mild relief.       Constitution: Negative for chills and fever.   HENT: Positive for sore throat. Negative for ear pain, congestion, postnasal drip, sinus pain and sinus pressure.    Neck: Negative for neck pain.   Cardiovascular: Negative for chest pain.   Eyes: Negative for eye pain.   Respiratory: Negative for chest tightness, cough and shortness of breath.    Gastrointestinal: Negative for nausea, vomiting, constipation and diarrhea.   Musculoskeletal: Negative for muscle ache.   Skin: Negative for rash and hives.   Allergic/Immunologic: Negative for hives, itching and sneezing.   Neurological: Negative for dizziness, light-headedness  and headaches.       Objective:      Physical Exam   Constitutional: He is oriented to person, place, and time. He appears well-developed. He is cooperative.  Non-toxic appearance. He does not appear ill. No distress.   HENT:   Head: Normocephalic and atraumatic.   Ears:   Right Ear: Hearing, tympanic membrane, external ear and ear canal normal.   Left Ear: Hearing, tympanic membrane, external ear and ear canal normal.   Nose: Nose normal. No mucosal edema, rhinorrhea, nasal deformity or congestion. No epistaxis. Right sinus exhibits no maxillary sinus tenderness and no frontal sinus tenderness. Left sinus exhibits no maxillary sinus tenderness and no frontal sinus tenderness.   Mouth/Throat: Uvula is midline and mucous membranes are normal. Mucous membranes are moist. No trismus in the jaw. Normal dentition. No uvula swelling. Posterior oropharyngeal erythema and cobblestoning present. No oropharyngeal exudate, posterior oropharyngeal edema or tonsillar abscesses. Tonsils are 1+ on the right. Tonsils are 1+ on the left. No tonsillar exudate. Oropharynx is clear.   Eyes: Conjunctivae and lids are normal. No scleral icterus.   Neck: Trachea normal and phonation normal. Neck supple. No edema present. No erythema present. No neck rigidity present.   Cardiovascular: Normal rate, regular rhythm, normal heart sounds and normal pulses.   Pulmonary/Chest: Effort normal and breath sounds normal. No respiratory distress. He has no decreased breath sounds. He has no rhonchi.   Abdominal: Normal appearance.   Musculoskeletal: Normal range of motion.         General: No deformity. Normal range of motion.   Neurological: He is alert and oriented to person, place, and time. He exhibits normal muscle tone. Coordination normal.   Skin: Skin is warm, dry, intact, not diaphoretic and not pale. Capillary refill takes less than 2 seconds.   Psychiatric: His speech is normal and behavior is normal. Judgment and thought content normal.    Nursing note and vitals reviewed.    Results for orders placed or performed in visit on 02/22/22   POCT COVID-19 Rapid Screening   Result Value Ref Range    POC Rapid COVID Negative Negative     Acceptable Yes    POCT Strep A, Molecular   Result Value Ref Range    Molecular Strep A, POC Negative Negative     Acceptable Yes            Assessment:       1. Sore throat    2. Hypertension, unspecified type          Plan:         Sore throat  -     POCT COVID-19 Rapid Screening  -     POCT Strep A, Molecular  -     (Magic mouthwash) 1:1:1 diphenhydramine(Benadryl) 12.5mg/5ml liq, aluminum & magnesium hydroxide-simethicone (Maalox), LIDOcaine viscous 2%; Swish and spit 10 mLs every 4 (four) hours as needed (sore throat).  Dispense: 180 mL; Refill: 0    Hypertension, unspecified type  -     nadoloL (CORGARD) 40 MG tablet; Take 1 tablet (40 mg total) by mouth once daily.  Dispense: 30 tablet; Refill: 0           Medical Decision Making:   Initial Assessment:   Pt is a 46 y/o male with a hx of HTN and LISA who presents with a 2-week hx of sore throat. Pt reports that he initially had congestion, PND, rhinorrhea, and a mild scratchy throat 2 weeks ago. He took nyquil and sinus symptoms had improved. He reports that the sore throat became worse today. BP elevated to 154/101. Recheck /94. Pt has been out of BP medication for about 3-4 months now. Other VSS. No chest pain or SOB. On exam, pt nontoxic and in NAD. Lungs CTAB. RRR. Posterior oropharyngeal erythema without tonsillar swelling or exudate. No abscess or mass noted.  Differential Diagnosis:   URI, COVID-19, influenza, other viral syndrome, allergies, strep vs viral pharyngitis  Clinical Tests:   Lab Tests: Ordered and Reviewed       <> Summary of Lab: POCT COVID and Strep negative.  Urgent Care Management:  Reviewed COVID and strep results with patient. Suspect symptoms viral in etiology. Will give magic mouthwash for sore throat.  Will also refill nadolol for BP. Pt will need to f/up with PCP. Check BP daily and keep BP for next PCP appt. Return and ED precautions for new or worsening symptoms, such as SOB or CP. Pt verbalizes understanding and agrees with plan.           Patient Instructions   - Rest.    - Drink plenty of fluids.  - Viral upper respiratory infections typically run their course in 10-14 days.      - You can take over-the-counter claritin, zyrtec, allegra, or xyzal as directed. These are antihistamines that can help with runny nose, nasal congestion, sneezing, and helps to dry up post-nasal drip, which usually causes sore throat and cough.              - If you do NOT have high blood pressure, you may use a decongestant form (D)  of this medication (ie. Claritin- D, zyrtec-D, allegra-D) or if you do not take the D form, you can take sudafed (pseudoephedrine) over the counter, which is a decongestant. Do NOT take two decongestant (D) medications at the same time (such as mucinex-D and claritin-D or plain sudafed and claritin D)    - If you DO have Hypertension, anxiety, or palpitations, it is safe to take Coricidin HBP for relief of sinus symptoms.     - You can use Flonase (fluticasone) nasal spray as directed for sinus congestion and postnasal drip. This is a steroid nasal spray that works locally over time to decrease the inflammation in your nose/sinuses and help with allergic symptoms. This is not an quick- relief spray like afrin, but it works well if used daily.  Discontinue if you develop nose bleed  - use nasal saline prior to Flonase.  - Use Ocean Spray Nasal Saline 1-3 puffs each nostril every 2-3 hours then blow out onto tissue. This is to irrigate the nasal passage way to clear the sinus openings. Use until sinus problem resolved.     - you can take plain Mucinex (guaifenesin) 1200 mg twice a day to help loosen mucous.      -warm salt water gargles can help with sore throat     - warm tea with honey can help with  cough. Honey is a natural cough suppressant.     - Dextromethorphan (DM) is a cough suppressant over the counter (ie. mucinex DM, robitussin, delsym; dayquil/nyquil has DM as well.)        - Follow up with your PCP or specialty clinic as directed in the next 1-2 weeks if not improved or as needed.  You can call [phone] to schedule an appointment with the appropriate provider.       - Go to the ER if you develop new or worsening symptoms.      - You must understand that you have received an Urgent Care treatment only and that you may be released before all of your medical problems are known or treated.   - You, the patient, will arrange for follow up care as instructed.   - If your condition worsens or fails to improve we recommend that you receive another evaluation at the ER immediately or contact your PCP to discuss your concerns or return here.

## 2022-02-23 NOTE — PATIENT INSTRUCTIONS

## 2022-06-01 ENCOUNTER — PATIENT MESSAGE (OUTPATIENT)
Dept: ADMINISTRATIVE | Facility: HOSPITAL | Age: 47
End: 2022-06-01
Payer: COMMERCIAL

## 2022-06-07 ENCOUNTER — OFFICE VISIT (OUTPATIENT)
Dept: FAMILY MEDICINE | Facility: CLINIC | Age: 47
End: 2022-06-07
Payer: COMMERCIAL

## 2022-06-07 VITALS
HEART RATE: 93 BPM | HEIGHT: 72 IN | SYSTOLIC BLOOD PRESSURE: 128 MMHG | TEMPERATURE: 98 F | OXYGEN SATURATION: 97 % | WEIGHT: 280.19 LBS | BODY MASS INDEX: 37.95 KG/M2 | DIASTOLIC BLOOD PRESSURE: 88 MMHG

## 2022-06-07 DIAGNOSIS — Z12.11 SCREENING FOR COLORECTAL CANCER: ICD-10-CM

## 2022-06-07 DIAGNOSIS — R79.89 ABNORMAL LIVER FUNCTION TESTS: ICD-10-CM

## 2022-06-07 DIAGNOSIS — G47.33 OSA (OBSTRUCTIVE SLEEP APNEA): ICD-10-CM

## 2022-06-07 DIAGNOSIS — G43.909 MIGRAINE SYNDROME: ICD-10-CM

## 2022-06-07 DIAGNOSIS — I10 ESSENTIAL HYPERTENSION: ICD-10-CM

## 2022-06-07 DIAGNOSIS — Z00.00 ROUTINE MEDICAL EXAM: Primary | ICD-10-CM

## 2022-06-07 DIAGNOSIS — E78.5 HYPERLIPIDEMIA, UNSPECIFIED HYPERLIPIDEMIA TYPE: ICD-10-CM

## 2022-06-07 DIAGNOSIS — E78.6 HIGH-DENSITY LIPOPROTEIN DEFICIENCY: ICD-10-CM

## 2022-06-07 DIAGNOSIS — F41.9 ANXIETY: ICD-10-CM

## 2022-06-07 DIAGNOSIS — M79.671 ARCH PAIN OF RIGHT FOOT: ICD-10-CM

## 2022-06-07 DIAGNOSIS — Z12.12 SCREENING FOR COLORECTAL CANCER: ICD-10-CM

## 2022-06-07 DIAGNOSIS — Z12.5 SCREENING FOR PROSTATE CANCER: ICD-10-CM

## 2022-06-07 PROCEDURE — 3079F PR MOST RECENT DIASTOLIC BLOOD PRESSURE 80-89 MM HG: ICD-10-PCS | Mod: CPTII,S$GLB,, | Performed by: INTERNAL MEDICINE

## 2022-06-07 PROCEDURE — 99999 PR PBB SHADOW E&M-EST. PATIENT-LVL III: CPT | Mod: PBBFAC,,, | Performed by: INTERNAL MEDICINE

## 2022-06-07 PROCEDURE — 99999 PR PBB SHADOW E&M-EST. PATIENT-LVL III: ICD-10-PCS | Mod: PBBFAC,,, | Performed by: INTERNAL MEDICINE

## 2022-06-07 PROCEDURE — 3074F SYST BP LT 130 MM HG: CPT | Mod: CPTII,S$GLB,, | Performed by: INTERNAL MEDICINE

## 2022-06-07 PROCEDURE — 99396 PR PREVENTIVE VISIT,EST,40-64: ICD-10-PCS | Mod: S$GLB,,, | Performed by: INTERNAL MEDICINE

## 2022-06-07 PROCEDURE — 3079F DIAST BP 80-89 MM HG: CPT | Mod: CPTII,S$GLB,, | Performed by: INTERNAL MEDICINE

## 2022-06-07 PROCEDURE — 1159F MED LIST DOCD IN RCRD: CPT | Mod: CPTII,S$GLB,, | Performed by: INTERNAL MEDICINE

## 2022-06-07 PROCEDURE — 99396 PREV VISIT EST AGE 40-64: CPT | Mod: S$GLB,,, | Performed by: INTERNAL MEDICINE

## 2022-06-07 PROCEDURE — 1159F PR MEDICATION LIST DOCUMENTED IN MEDICAL RECORD: ICD-10-PCS | Mod: CPTII,S$GLB,, | Performed by: INTERNAL MEDICINE

## 2022-06-07 PROCEDURE — 3008F PR BODY MASS INDEX (BMI) DOCUMENTED: ICD-10-PCS | Mod: CPTII,S$GLB,, | Performed by: INTERNAL MEDICINE

## 2022-06-07 PROCEDURE — 3008F BODY MASS INDEX DOCD: CPT | Mod: CPTII,S$GLB,, | Performed by: INTERNAL MEDICINE

## 2022-06-07 PROCEDURE — 3074F PR MOST RECENT SYSTOLIC BLOOD PRESSURE < 130 MM HG: ICD-10-PCS | Mod: CPTII,S$GLB,, | Performed by: INTERNAL MEDICINE

## 2022-06-07 RX ORDER — ROSUVASTATIN CALCIUM 10 MG/1
10 TABLET, COATED ORAL DAILY
Qty: 90 TABLET | Refills: 3 | Status: SHIPPED | OUTPATIENT
Start: 2022-06-07 | End: 2023-06-20

## 2022-06-07 RX ORDER — NADOLOL 40 MG/1
TABLET ORAL
Qty: 90 TABLET | Refills: 3 | Status: SHIPPED | OUTPATIENT
Start: 2022-06-07 | End: 2023-06-22 | Stop reason: SDUPTHER

## 2022-06-07 RX ORDER — PAROXETINE HYDROCHLORIDE 20 MG/1
20 TABLET, FILM COATED ORAL EVERY MORNING
Qty: 90 TABLET | Refills: 3 | Status: SHIPPED | OUTPATIENT
Start: 2022-06-07 | End: 2023-06-19

## 2022-06-07 NOTE — PROGRESS NOTES
Chief complaint:  Physical, establish care    Patient is a 47-year-old white male new to me.  He travels with work a lot as been difficult for him to come in for checkups.  He is due for labs.  We will initiate prostate cancer screening since he is close to 50 and update colon cancer screening since he is over 45 and his last scope was 2009. He does have some longstanding high LDL and low HDL and discussed the need for treatment.  I will prescribe Crestor we will do labs to confirm before he starts very high probability all recommend he start.  He got off his Paxil for six months and returned with anxiety.  He is back on it now.  He also had a history of headaches and migraines and blood pressure so was put on nadolol.  He has been off of that as well but back on it with less headaches and better blood pressure.  We discussed monitoring blood pressure to make sure his diastolics are not in the high 80s and we would make adjustments.  If pulse allows we could increase nadolol.    Also for the past eight months or so with a lot of walking during the day after about 30 minutes he gets pain in the arch of the right foot.  Sometimes radiating up the back of the leg into the calf.  He got some insoles and wearing the minute somewhat better although does worsen as the day goes on we discussed that it is probably an arch issue and it would be good for him to see Podiatry for the long run.  He may have had some Achilles tendinitis but none present today.  Pain is over the arch.    ROS:   CONST: weight stable. EYES: no vision change. ENT: no sore throat. CV: no chest pain w/ exertion. RESP: no shortness of breath. GI: no nausea, vomiting, diarrhea. No dysphagia. : no urinary issues. MUSCULOSKELETAL: no new myalgias or arthralgias. SKIN: no new changes. NEURO: no focal deficits. PSYCH: no new issues. ENDOCRINE: no polyuria. HEME: no lymph nodes. ALLERGY: no general pruritis.    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  Travels a lot      family history includes Diabetes in his mother.  Mom may have had uterine cancer.    Gen: no distress  EYES: conjunctiva clear, non-icteric, PERRL  ENT: nose clear, nasal mucosa normal, oropharynx clear and moist, teeth good  NECK:supple, thyroid non-palpable  RESP: effort is good, lungs clear  CV: heart RRR w/o murmur, gallops or rubs; no carotid bruits, no edema  GI: abdomen soft, non-distended, non-tender, no hepatosplenomegaly  MS: gait normal, no clubbing or cyanosis of the digits, some pain in the arch of the right foot, no Achilles tendinitis  SKIN: no rashes, warm to touch    Labs reviewed    Jaron was seen today for medication refill.    Diagnoses and all orders for this visit:    Routine medical exam, new to me, update colon and prostate cancer screening.  -     PSA, Screening; Future  -     Comprehensive Metabolic Panel; Future  -     Hemoglobin A1C; Future  -     TSH; Future  -     Lipid Panel; Future  -     CBC Auto Differential; Future    Essential hypertension, monitor blood pressure and pulse and we will adjust  -     nadoloL (CORGARD) 40 MG tablet; TAKE 1 TABLET(40 MG) BY MOUTH EVERY DAY    Anxiety, Paxil restarted  -     paroxetine (PAXIL) 20 MG tablet; Take 1 tablet (20 mg total) by mouth every morning.    Migraine syndrome, restart nadolol    LISA (obstructive sleep apnea), continues on CPAP    Abnormal liver function tests, probably fatty liver and metabolic syndrome with fatty Jessica history of diabetes, reassess, work on weight  loss    Screening for prostate cancer  -     PSA, Screening; Future    Screening for colorectal cancer  -     Case Request Endoscopy: COLONOSCOPY    Arch pain of right foot, arch problem  -     Ambulatory referral/consult to Podiatry; Future    Hyperlipidemia, unspecified hyperlipidemia type, initiate Crestor    High-density lipoprotein deficiency    Other orders  -     rosuvastatin (CRESTOR) 10 MG tablet; Take 1 tablet (10 mg total) by mouth once daily.       Answers for HPI/ROS submitted by the patient on 6/6/2022  Onset: more than 1 year ago  Progression since onset: waxing and waning  anxiety: Yes  blurred vision: No  chest pain: No  headaches: Yes  malaise/fatigue: Yes  neck pain: No  orthopnea: No  palpitations: No  peripheral edema: No  PND: No  shortness of breath: Yes  sweats: No  Agents associated with hypertension: no associated agents  CAD risks: family history, obesity, stress  Compliance problems: exercise  Past treatments: beta blockers  Improvement on treatment: significant

## 2022-06-21 ENCOUNTER — HOSPITAL ENCOUNTER (OUTPATIENT)
Dept: RADIOLOGY | Facility: HOSPITAL | Age: 47
Discharge: HOME | End: 2022-06-21
Attending: PODIATRIST
Payer: COMMERCIAL

## 2022-06-21 ENCOUNTER — OFFICE VISIT (OUTPATIENT)
Dept: PODIATRY | Facility: CLINIC | Age: 47
End: 2022-06-21
Payer: COMMERCIAL

## 2022-06-21 VITALS — WEIGHT: 280 LBS | HEIGHT: 72 IN | BODY MASS INDEX: 37.93 KG/M2

## 2022-06-21 DIAGNOSIS — M72.2 PLANTAR FASCIITIS: ICD-10-CM

## 2022-06-21 DIAGNOSIS — M79.671 ARCH PAIN OF RIGHT FOOT: ICD-10-CM

## 2022-06-21 DIAGNOSIS — M72.2 PLANTAR FASCIITIS: Primary | ICD-10-CM

## 2022-06-21 PROCEDURE — 1159F MED LIST DOCD IN RCRD: CPT | Mod: CPTII,S$GLB,, | Performed by: PODIATRIST

## 2022-06-21 PROCEDURE — 3008F PR BODY MASS INDEX (BMI) DOCUMENTED: ICD-10-PCS | Mod: CPTII,S$GLB,, | Performed by: PODIATRIST

## 2022-06-21 PROCEDURE — 3008F BODY MASS INDEX DOCD: CPT | Mod: CPTII,S$GLB,, | Performed by: PODIATRIST

## 2022-06-21 PROCEDURE — 99999 PR PBB SHADOW E&M-EST. PATIENT-LVL IV: CPT | Mod: PBBFAC,,, | Performed by: PODIATRIST

## 2022-06-21 PROCEDURE — 73630 XR FOOT COMPLETE 3 VIEW RIGHT: ICD-10-PCS | Mod: 26,RT,, | Performed by: RADIOLOGY

## 2022-06-21 PROCEDURE — 73630 X-RAY EXAM OF FOOT: CPT | Mod: TC,FY,PO,RT

## 2022-06-21 PROCEDURE — 73630 X-RAY EXAM OF FOOT: CPT | Mod: 26,RT,, | Performed by: RADIOLOGY

## 2022-06-21 PROCEDURE — 99999 PR PBB SHADOW E&M-EST. PATIENT-LVL IV: ICD-10-PCS | Mod: PBBFAC,,, | Performed by: PODIATRIST

## 2022-06-21 PROCEDURE — 99203 OFFICE O/P NEW LOW 30 MIN: CPT | Mod: S$GLB,,, | Performed by: PODIATRIST

## 2022-06-21 PROCEDURE — 1159F PR MEDICATION LIST DOCUMENTED IN MEDICAL RECORD: ICD-10-PCS | Mod: CPTII,S$GLB,, | Performed by: PODIATRIST

## 2022-06-21 PROCEDURE — 99203 PR OFFICE/OUTPT VISIT, NEW, LEVL III, 30-44 MIN: ICD-10-PCS | Mod: S$GLB,,, | Performed by: PODIATRIST

## 2022-06-21 RX ORDER — DICLOFENAC SODIUM 10 MG/G
2 GEL TOPICAL DAILY
Qty: 100 G | Refills: 3 | Status: SHIPPED | OUTPATIENT
Start: 2022-06-21

## 2022-06-24 NOTE — PROGRESS NOTES
Subjective:      Patient ID: Jaron Stein is a 47 y.o. male.    Chief Complaint: Foot Problem (Right foot pain )    Jaron is a 47 y.o. male who presents to the podiatry clinic  with complaint of  right foot pain. Onset of the symptoms was several weeks ago. Precipitating event: none known. Current symptoms include: ability to bear weight, but with some pain. Aggravating factors: any weight bearing. Symptoms have progressed to a point and plateaued. Patient has had no prior foot problems. Evaluation to date: none. Treatment to date: none. Patients rates pain 4/10 on pain scale.        Review of Systems   Constitutional: Negative for chills.   Cardiovascular: Negative for chest pain and claudication.   Respiratory: Negative for cough.    Skin: Positive for color change, dry skin and nail changes.   Musculoskeletal: Positive for joint pain.   Gastrointestinal: Negative for nausea.   Neurological: Positive for paresthesias. Negative for numbness.   Psychiatric/Behavioral: The patient is not nervous/anxious.            Objective:      Physical Exam  Constitutional:       Appearance: He is well-developed.      Comments: Oriented to time, place, and person.   Cardiovascular:      Comments: DP and PT pulses are palpable bilaterally. 3 sec capillary refill time and toes and feet are warm to touch proximally .  There is  hair growth on the feet and toes b/l. There is no edema b/l. No spider veins or varicosities present b/l.     Musculoskeletal:      Comments: Equinus noted b/l ankles with < 10 deg DF noted. MMT 5/5 in DF/PF/Inv/Ev resistance with no reproduction of pain in any direction. Passive range of motion of ankle and pedal joints is painless b/l.    Pain on palpation plantar medial B/l heel  no pain with ROM or MMT or medial and lateral compression of heel, - tinel's sign     Feet:      Right foot:      Skin integrity: No callus or dry skin.      Left foot:      Skin integrity: No callus or dry skin.    Lymphadenopathy:      Comments: Negative lymphadenopathy bilateral popliteal fossa and tarsal tunnel.   Skin:     Comments: No open lesions, lacerations or wounds noted.Interdigital spaces clean, dry and intact b/l. No erythema noted to b/l foot.  Nails normal color and trophic qualities.     Neurological:      Mental Status: He is alert.      Comments: Light touch, proprioception, and sharp/dull sensation are all intact bilaterally. Protective threshold with the [place] monofilament is intact bilaterally.    Psychiatric:         Behavior: Behavior is cooperative.               Assessment:       Encounter Diagnoses   Name Primary?    Arch pain of right foot     Plantar fasciitis Yes         Plan:       Jaron was seen today for foot problem.    Diagnoses and all orders for this visit:    Plantar fasciitis  -     X-Ray Foot Complete Right; Future  -     Ambulatory referral/consult to Physical/Occupational Therapy; Future    Arch pain of right foot  -     Ambulatory referral/consult to Podiatry    Other orders  -     diclofenac sodium (VOLTAREN) 1 % Gel; Apply 2 g topically once daily.      I counseled the patient on his conditions, their implications and medical management.        Discussed different treatment options for heel pain. including conservative and interventional.  I gave written and verbal instructions on heel cord stretching and this was demonstrated for the patient. Patient expressed understanding. Discussed wearing appropriate shoe gear and avoiding flats, slippers, sandals, barefoot, and sockfeet. Recommended arch supports. My recommendation for OTC supports is [employer], [employer]ICS tennis shoes.       Patient instructed on adequate icing techniques. Patient should ice the affected area at least once per day x 10 minutes for 10 days . I advised the  patient that extra icing would also be beneficial to ensure adequate anti inflammatory effect     Stretching handout dispensed to patient.  Instructions on adequate stretching reviewed in clinic        Referral placed to PT    Rx Voltaren gel to be applied to affected area up to 3-4 x daily as needed for pain    RTC PRN

## 2023-06-19 DIAGNOSIS — F41.9 ANXIETY: ICD-10-CM

## 2023-06-19 RX ORDER — PAROXETINE HYDROCHLORIDE 20 MG/1
20 TABLET, FILM COATED ORAL DAILY
Qty: 90 TABLET | Refills: 0 | Status: SHIPPED | OUTPATIENT
Start: 2023-06-19

## 2023-06-19 NOTE — TELEPHONE ENCOUNTER
Refill Routing Note   Medication(s) are not appropriate for processing by [facility] for the following reason(s):      Required labs outdated    [facility] action(s):  Defer  Approve Appointment due  Labs due            Appointments  past 12m or future 3m with PCP    Date Provider   Last Visit   6/7/2022 Ayo Hernandez MD   Next Visit   Visit date not found Ayo Hernandez MD   ED visits in past 90 days: 0        Note composed:4:24 PM 06/19/2023

## 2023-06-19 NOTE — TELEPHONE ENCOUNTER
Care Due:                  Date            Visit Type   Department     Provider  --------------------------------------------------------------------------------                                MYCHART                              ANNUAL       [facility]                              CHECKUP/PHY  MED/ INTERNAL  Ayo Eubanks  Last Visit: 06-      S            MED/ PEDS      [place]  Next Visit: None Scheduled  None         None Found                                                            Last  Test          Frequency    Reason                     Performed    Due Date  --------------------------------------------------------------------------------    Office Visit  12 months..  paroxetine, rosuvastatin.  06- 06-    CMP.........  12 months..  rosuvastatin.............  Not Found    Overdue    Lipid Panel.  12 months..  rosuvastatin.............  Not Found    Overdue    [employer] Embedded Care Due Messages. Reference number: [id number].   6/19/2023 8:46:54 AM CDT

## 2023-06-20 RX ORDER — ROSUVASTATIN CALCIUM 10 MG/1
TABLET, COATED ORAL
Qty: 90 TABLET | Refills: 0 | Status: SHIPPED | OUTPATIENT
Start: 2023-06-20

## 2023-06-22 DIAGNOSIS — I10 ESSENTIAL HYPERTENSION: ICD-10-CM

## 2023-06-22 NOTE — TELEPHONE ENCOUNTER
No care due was identified.  [facility] Embedded Care Due Messages. Reference number: [id number].   6/22/2023 4:03:56 PM CDT

## 2023-06-24 RX ORDER — NADOLOL 40 MG/1
TABLET ORAL
Qty: 90 TABLET | Refills: 3 | Status: SHIPPED | OUTPATIENT
Start: 2023-06-24

## 2023-08-01 ENCOUNTER — PATIENT OUTREACH (OUTPATIENT)
Dept: ADMINISTRATIVE | Facility: HOSPITAL | Age: 48
End: 2023-08-01
Payer: COMMERCIAL

## 2023-08-01 DIAGNOSIS — Z13.6 SCREENING FOR CARDIOVASCULAR CONDITION: Primary | ICD-10-CM

## 2023-08-01 DIAGNOSIS — Z11.59 NEED FOR HEPATITIS C SCREENING TEST: ICD-10-CM

## 2023-08-01 DIAGNOSIS — R73.03 PRE-DIABETES: ICD-10-CM

## 2023-12-28 ENCOUNTER — PATIENT OUTREACH (OUTPATIENT)
Dept: ADMINISTRATIVE | Facility: HOSPITAL | Age: 48
End: 2023-12-28
Payer: COMMERCIAL

## 2024-11-12 ENCOUNTER — OFFICE VISIT (OUTPATIENT)
Dept: FAMILY MEDICINE | Facility: CLINIC | Age: 49
End: 2024-11-12
Payer: COMMERCIAL

## 2024-11-12 VITALS
TEMPERATURE: 99 F | HEART RATE: 87 BPM | DIASTOLIC BLOOD PRESSURE: 90 MMHG | OXYGEN SATURATION: 98 % | BODY MASS INDEX: 36.19 KG/M2 | SYSTOLIC BLOOD PRESSURE: 146 MMHG | WEIGHT: 266.88 LBS

## 2024-11-12 DIAGNOSIS — G47.33 OSA (OBSTRUCTIVE SLEEP APNEA): ICD-10-CM

## 2024-11-12 DIAGNOSIS — I10 ESSENTIAL HYPERTENSION: Primary | ICD-10-CM

## 2024-11-12 DIAGNOSIS — E66.9 OBESITY, UNSPECIFIED CLASS, UNSPECIFIED OBESITY TYPE, UNSPECIFIED WHETHER SERIOUS COMORBIDITY PRESENT: ICD-10-CM

## 2024-11-12 DIAGNOSIS — E78.5 HYPERLIPIDEMIA, UNSPECIFIED HYPERLIPIDEMIA TYPE: ICD-10-CM

## 2024-11-12 DIAGNOSIS — Z11.59 SCREENING FOR VIRAL DISEASE: ICD-10-CM

## 2024-11-12 DIAGNOSIS — Z11.4 ENCOUNTER FOR SCREENING FOR HIV: ICD-10-CM

## 2024-11-12 DIAGNOSIS — Z12.11 COLON CANCER SCREENING: ICD-10-CM

## 2024-11-12 DIAGNOSIS — Z23 NEEDS FLU SHOT: ICD-10-CM

## 2024-11-12 PROCEDURE — 3008F BODY MASS INDEX DOCD: CPT | Mod: CPTII,S$GLB,, | Performed by: NURSE PRACTITIONER

## 2024-11-12 PROCEDURE — 3080F DIAST BP >= 90 MM HG: CPT | Mod: CPTII,S$GLB,, | Performed by: NURSE PRACTITIONER

## 2024-11-12 PROCEDURE — 1159F MED LIST DOCD IN RCRD: CPT | Mod: CPTII,S$GLB,, | Performed by: NURSE PRACTITIONER

## 2024-11-12 PROCEDURE — 99214 OFFICE O/P EST MOD 30 MIN: CPT | Mod: 25,S$GLB,, | Performed by: NURSE PRACTITIONER

## 2024-11-12 PROCEDURE — 90471 IMMUNIZATION ADMIN: CPT | Mod: S$GLB,,, | Performed by: NURSE PRACTITIONER

## 2024-11-12 PROCEDURE — 90656 IIV3 VACC NO PRSV 0.5 ML IM: CPT | Mod: S$GLB,,, | Performed by: NURSE PRACTITIONER

## 2024-11-12 PROCEDURE — 3077F SYST BP >= 140 MM HG: CPT | Mod: CPTII,S$GLB,, | Performed by: NURSE PRACTITIONER

## 2024-11-12 PROCEDURE — 99999 PR PBB SHADOW E&M-EST. PATIENT-LVL IV: CPT | Mod: PBBFAC,,, | Performed by: NURSE PRACTITIONER

## 2024-11-12 RX ORDER — NADOLOL 20 MG/1
TABLET ORAL
Qty: 30 TABLET | Refills: 3 | Status: SHIPPED | OUTPATIENT
Start: 2024-11-12 | End: 2024-11-12

## 2024-11-12 RX ORDER — NADOLOL 20 MG/1
TABLET ORAL
Qty: 30 TABLET | Refills: 3 | Status: SHIPPED | OUTPATIENT
Start: 2024-11-12 | End: 2024-11-13

## 2024-11-12 NOTE — PROGRESS NOTES
HPI     Jaron Stein is a 49 y.o. male with multiple medical diagnoses as listed in the medical history and problem list that presents for   Chief Complaint   Patient presents with    Hypertension    Annual Exam       Hypertension  This is a chronic problem. The current episode started more than 1 year ago. The problem has been gradually worsening since onset. The problem is uncontrolled. Associated symptoms include palpitations. Pertinent negatives include no anxiety, blurred vision, chest pain, headaches, malaise/fatigue, neck pain, peripheral edema or shortness of breath. There are no associated agents to hypertension. Risk factors for coronary artery disease include obesity and male gender. Past treatments include lifestyle changes and beta blockers. The current treatment provides moderate improvement. Compliance problems: he is out of Nadolol.    Patient states was previously prescribed nadolol for hypertension.  He has been working in [place] and for this reason he is out of medication.  He is not taking medication in several months.  Returns to clinic today requesting refill.  His labs are overdue.        Assessment & Plan     1. Essential hypertension  BP Readings from Last 3 Encounters:   11/12/24 (!) 146/90   06/07/22 128/88   02/22/22 (!) 150/94     -will provide refill of nadolol.  Discussed the importance of measuring blood pressure regularly and notify provider of results.  Labs are overdue by several years.  Orders placed today.  -DASH diet, regular cardiovascular exercises, portion control  -weight loss  -f/u with BP logs in 2 weeks    - nadoloL (CORGARD) 20 MG tablet; TAKE 1 TABLET(40 MG) BY MOUTH EVERY DAY  Dispense: 30 tablet; Refill: 3  - CBC Auto Differential; Future  - Comprehensive Metabolic Panel; Future  - Lipid Panel; Future  - Hemoglobin A1C; Future    2. Obesity, unspecified class, unspecified obesity type, unspecified whether serious comorbidity present  We discussed weight issues  and safe, effective ways of losing pounds, includin) diet:  low carbohydrate, low fat diet, stay away from fast food, fried and processed food, use whole grain, lot of fruits and vegetables, use healthy fat such as avocado, nuts and olive oil in reasonable quantity, stay away from sodas. Regular meals with lean proteins.  2) physical activity: ideally 150 min a week, with cardiovascular and resistance activity.  Patient was encouraged to set realistic attainable goals for weight loss, and we will follow up periodically.    Discussed Mediterranean Diet recommendations (Adopted from Teresa et al, [facility], 2018.)  - Eat primarily plant-based foods, such as fruits and vegetables, whole grains, legumes (beans) and nuts  - Limit refined carbohydrates (white pasta, bread, rice).  - Replace butter with healthy fats such as olive oil.  - Use herbs and spices instead of salt to flavor foods.  - Limit red meat and processed meats to no more than a few times a month.  - Avoid sugary sodas, bakery goods, and sweets.  - Eat fish and poultry at least twice a week.   - CBC Auto Differential; Future  - Comprehensive Metabolic Panel; Future  - Lipid Panel; Future  - Hemoglobin A1C; Future    3. Hyperlipidemia, unspecified hyperlipidemia type  discussed ways to lower triglycerides such as cutting simple sugars out of diet (white breads, candies, cookies, cakes, etc.) and reducing/eliminating intake of highly processed trans fatty acids.   Exercise 30 minutes a day for 4-5 days a week.   Eat more fiber.   - CBC Auto Differential; Future  - Comprehensive Metabolic Panel; Future  - Lipid Panel; Future  - Hemoglobin A1C; Future    4. Screening for viral disease  - Hepatitis C antibody; Future    5. Encounter for screening for HIV  - HIV 1/2 Ag/Ab (4th Gen); Future    6. Colon cancer screening  - Cologuard Screening (Multitarget Stool DNA); Future  - Cologuard Screening (Multitarget Stool DNA)    7. Needs flu shot  - influenza  (Flulaval, Fluzone, Fluarix) 45 mcg/0.5 mL IM vaccine (> or = 6 mo) 0.5 mL    8. LISA (obstructive sleep apnea)  CPAP compliance: yes.  Patient states his sleep apnea machine is old and he is requesting a new one.  Will refer to sleep Medicine  The patient reports that they continue to benefit from regular use of their CPAP machine.  We discussed the potential ramifications of untreated sleep apnea, which could include daytime sleepiness, hypertension, heart disease including CHF, sudden death while sleeping and/or stroke. The patient was advised to abstain from driving should they feel sleepy or drowsy.  We discussed potential treatment options, which could include weight loss, body positioning, continuous positive airway pressure (CPAP), or referral for surgical consideration.    - Ambulatory referral/consult to Sleep Disorders; Future          --------------------------------------------      Health Maintenance:  Health Maintenance         Date Due Completion Date    Hepatitis C Screening Never done ---    HIV Screening Never done ---    Colorectal Cancer Screening 02/20/2020 6/18/2009    Hemoglobin A1c (Diabetic Prevention Screening) 02/26/2021 2/26/2018    Lipid Panel 05/15/2022 5/15/2017    Influenza Vaccine (1) 09/01/2024 10/23/2018    COVID-19 Vaccine (2 - 2024-25 season) 09/01/2024 7/14/2021    TETANUS VACCINE 10/23/2028 10/23/2018    RSV Vaccine (Age 60+ and Pregnant patients) (1 - 1-dose 75+ series) 02/20/2050 ---            Colon cancer screening ordered, Advised patient on the importance of completing overdue health maintenance items, and Flu vaccine ordered    Follow Up:  Follow up in about 2 weeks (around 11/26/2024), or if symptoms worsen or fail to improve.    Exam     Review of Systems:  (as noted above)  Review of Systems   Constitutional:  Negative for activity change, fever, malaise/fatigue and unexpected weight change.   HENT:  Negative for hearing loss, rhinorrhea and trouble swallowing.     Eyes:  Negative for blurred vision, discharge and visual disturbance.   Respiratory:  Negative for chest tightness, shortness of breath and wheezing.    Cardiovascular:  Positive for palpitations. Negative for chest pain.   Gastrointestinal:  Negative for blood in stool, constipation, diarrhea and vomiting.   Endocrine: Negative for polydipsia and polyuria.   Genitourinary:  Negative for difficulty urinating, hematuria and urgency.   Musculoskeletal:  Negative for arthralgias, joint swelling and neck pain.   Neurological:  Negative for weakness and headaches.   Psychiatric/Behavioral:  Negative for confusion and dysphoric mood.        Physical Exam:   Physical Exam  Constitutional:       General: He is not in acute distress.     Appearance: He is obese. He is not ill-appearing or diaphoretic.   HENT:      Head: Normocephalic and atraumatic.   Eyes:      General: No scleral icterus.  Cardiovascular:      Rate and Rhythm: Normal rate and regular rhythm.   Pulmonary:      Effort: No respiratory distress.   Chest:      Chest wall: No tenderness.   Neurological:      General: No focal deficit present.      Mental Status: He is alert and oriented to person, place, and time.       Vitals:    11/12/24 1552   BP: (!) 146/90   BP Location: Right arm   Patient Position: Sitting   Pulse: 87   Temp: 98.5 °F (36.9 °C)   TempSrc: Oral   SpO2: 98%   Weight: 121.1 kg (266 lb 13.9 oz)      Body mass index is 36.19 kg/m².        History     Past Medical History:  Past Medical History:   Diagnosis Date    Abnormal liver function tests 6/7/2022    Anxiety     Headache, migraine     High-density lipoprotein deficiency 6/7/2022    Hyperlipidemia 6/7/2022    Hypertension     Migraine syndrome 6/7/2022    Obesity     LISA (obstructive sleep apnea)        Past Surgical History:  Past Surgical History:   Procedure Laterality Date    NO PAST SURGERIES      VASECTOMY         Social History:  Social History     Socioeconomic History    Marital  status:    Tobacco Use    Smoking status: Former    Smokeless tobacco: Never    Tobacco comments:     quit 6-7 years ago   Substance and Sexual Activity    Alcohol use: Yes     Comment: socially    Drug use: No    Sexual activity: Yes     Partners: Female     Social Drivers of Health     Financial Resource Strain: Low Risk  (11/11/2024)    Overall Financial Resource Strain (CARDIA)     Difficulty of Paying Living Expenses: Not hard at all   Food Insecurity: No Food Insecurity (11/11/2024)    Hunger Vital Sign     Worried About Running Out of Food in the Last Year: Never true     Ran Out of Food in the Last Year: Never true   Transportation Needs: No Transportation Needs (6/6/2022)    PRAPARE - Transportation     Lack of Transportation (Medical): No     Lack of Transportation (Non-Medical): No   Physical Activity: Insufficiently Active (11/11/2024)    Exercise Vital Sign     Days of Exercise per Week: 2 days     Minutes of Exercise per Session: 20 min   Stress: No Stress Concern Present (11/11/2024)    [employer] - Occupational Stress Questionnaire     Feeling of Stress : Only a little   Housing Stability: Unknown (6/6/2022)    Housing Stability Vital Sign     Unable to Pay for Housing in the Last Year: No     Unstable Housing in the Last Year: No       Family History:  Family History   Problem Relation Name Age of Onset    Diabetes Mother         Allergies and Medications: (updated and reviewed)  Review of patient's allergies indicates:  No Known Allergies  Current Outpatient Medications   Medication Sig Dispense Refill    nadoloL (CORGARD) 20 MG tablet TAKE 1 TABLET(40 MG) BY MOUTH EVERY DAY 30 tablet 3    rosuvastatin (CRESTOR) 10 MG tablet TAKE 1 TABLET(10 MG) BY MOUTH EVERY DAY (Patient not taking: Reported on 11/12/2024) 90 tablet 0     Current Facility-Administered Medications   Medication Dose Route Frequency Provider Last Rate Last Admin    influenza (Flulaval, Fluzone,  Fluarix) 45 mcg/0.5 mL IM vaccine (> or = 6 mo) 0.5 mL  0.5 mL Intramuscular 1 time in Clinic/HOD            Patient Care Team:  Ayo Hernandez MD as PCP - General (Internal Medicine)         - The patient is given an After Visit Summary that lists all medications with directions, allergies, education, orders placed during this encounter and follow-up instructions.      - I have reviewed the patient's medical information including past medical, family, and social history sections including the medications and allergies.      - We discussed the patient's current medications.     This note was created by combination of typed  and MModal dictation.  Transcription errors may be present.  If there are any questions, please contact me.

## 2024-11-12 NOTE — PATIENT INSTRUCTIONS
"[facility]--[phone]  Imaging, Xray, CT, MRI, Ultrasound---[phone]  Bariatrics---[phone]  Breast Surgery---[phone]  Case Management---[phone]  Colonoscopy---[phone]  DME---[phone]  Infectious Disease---[phone]  Interventional Radiology---[phone]  Medical Records---[phone]  [facility]---[phone]  Optometry/Ophthalmology---[phone]  [facility]---[phone]  Physical Therapy---[phone]  Psychiatry---[phone] or [phone]  Plastic Surgery---[phone]  Recovery--[phone] option 2, or [phone].  Sleep Study---[phone]  Smoking Cessation---[phone]  Wound Care---[phone]  Referral Desk---[phone]      Patient Education       Weight Loss Diet   About this topic   There are many "trendy" weight loss diets that are popular today. Many of these diets can end up being more harmful than helpful. The healthiest way to lose weight is to burn more calories than you eat.  A weight loss diet should help you have a healthy view of eating. It is NOT healthy to stop eating to try and lose weight. A good diet plan will help you cut down your food intake and make healthy choices.  A healthy weight loss goal is 1 to 2 pounds (0.5 to 1 kg) per week. Reducing calories in your diet, burning calories through exercise, or both can help you lose weight. Combining a healthy diet with regular physical activity can help you get the best results.  To cut calories in your diet you can:  Switch from whole milk to 1% or skim milk.  Switch from regular cheese to low-fat or fat-free cheese.  Use healthier condiment choices:  Fat-free or low-fat sour cream or salad dressings  Spray butter  Diet syrups or jellies over regular  Try frozen yogurt as a dessert rather than eating ice cream.  Skip the chips. Snack on carrots, vegetables, or fruit. If chips are a favorite of yours, try the baked style and watch portion size.  Eat grilled, roasted, boiled, broiled, " or baked meats. Avoid deep-frying. Choose skinless poultry, lean red meat, lean cuts of pork, and fish for good protein sources.  Try flavored no-calorie lemus. Do not drink soda and juices that have many calories.  Choose fruit instead of sweets.  General   Eating smaller meals more often may be helpful. This will keep you from overeating at your next meal. Also, eating meals slowly helps you feel full faster.  If eating 3 meals is a part of your lifestyle, choose more lean proteins and higher fiber foods to fill you up at each meal.  Do not skip meals. Most often if you skip a meal, you eat too much at the next meal.  Eat smaller portions. Use a smaller plate or bowl for meals, and when you are eating out, eat half and take the rest home.  Plan ahead. Plan your meals and grocery list before going to the store. Planning will keep you from getting meals from restaurants.  Do not go to the grocery store hungry. You are more likely to buy snacks that are not good for you.  Portion out snacks. When you are having a snack, instead of grabbing the whole bag, portion a small amount out to give yourself a stopping point.  Drink water before and after your meals to help fill you up without the calories.  When eating starchy foods, choose whole-grain products. These have a lot of fiber which will make you feel full. Fiber also helps lower cholesterol and helps with bowel function.  If you need a helpful start, ask your doctor to send you to a dietitian for weight loss help.         What will the results be?   Losing excess weight will make your whole body healthier. You will have more energy for your daily activities and lower your risk for health problems.  What lifestyle changes are needed?   Stay active. Eating healthy is not always enough to lose weight. Burning calories by exercising is a big part of weight loss.  What foods are good to eat?   The key is to watch your portion sizes. It is best to choose foods that are  lower in fat and calories.  Choose lean meats:  Boneless, skinless chicken breast  Pork loin  90% lean beef  Lean turkey meat  Fresh fish (not fried)  Choose low-fat dairy products:  1% or skim milk  Spray butter or margarine  Low-fat or fat-free cheese  Frozen yogurt or low-calorie ice cream  Choose fresh fruits, vegetables, beans and lentils, and whole wheat products more often.  Choose water to drink more often. Drink diet or no-calorie beverages when you want something other than water. Aim to get your calories from the foods you eat.  Choose smart snacks:  Fruits  Vegetables  Low-fat or nonfat yogurt  Low-fat or no-fat cheese, such as cottage cheese  Unsalted nuts  Hard-boiled egg  Hummus  Guacamole  Natural peanut butter  Popcorn with no butter ? use pepper, garlic, or another spice to taste  Whole grain crackers  What foods should be limited or avoided?   Limit high-fat, high-sodium, and high-calorie foods like:  Fried foods  Processed meats  Whole-fat dairy products  Candy, cookies, chips, pastries  Sausage, hancock, any full-fat meats  Soda, juice  Beer, wine, and mixed drinks (alcohol)  Will there be any other care needed?   What do I do first before trying to lose weight?  Talk to your doctor and dietitian to see if you need to lose weight. Work with them to set your weight loss goals.  If you have a chronic illness, such as high blood sugar or high blood pressure, ask a doctor or dietitian what diet and exercise is right for you.  Ask your doctor about how much you are able to exercise and what type of exercise is good for you.  Helpful tips   Keep a food journal to help keep you on track.  Join a support group.  Tips for burning calories:  If your workplace is near your house, choose to walk or bike to work instead of driving.  Take 20-minute walks each day. Walk around during your lunch break. You will not only burn calories, but will raise your energy for the rest of the day.  Take the stairs over the  elevator.  Join a gym or exercise class with a friend.  Try to exercise 30 minutes a day for overall health. Three 10-minute sessions work too. Aim for 60 to 90 minutes a day to lose weight.  Drink lots of water before, during, and after exercise.  Where can I learn more?   Academy of Nutrition and Dietetics  https://www.eatright.org/health/weight-loss/your-health-and-your-weight/back-to-basics-for-healthy-weight-loss   Centers for Disease Control and Prevention  https://www.cdc.gov/healthyweight/healthy_eating/index.html   Familydoctor.org  https://familydoctor.org/nutrition-weight-loss-need-know-fad-diets/   NHS  https://www.nhs.uk/live-well/healthy-weight/12-tips-to-help-you-lose-weight/?tabname=you-and-your-weight   Last Reviewed Date   2021-06-24  Consumer Information Use and Disclaimer   This information is not specific medical advice and does not replace information you receive from your health care provider. This is only a brief summary of general information. It does NOT include all information about conditions, illnesses, injuries, tests, procedures, treatments, therapies, discharge instructions or life-style choices that may apply to you. You must talk with your health care provider for complete information about your health and treatment options. This information should not be used to decide whether or not to accept your health care providers advice, instructions or recommendations. Only your health care provider has the knowledge and training to provide advice that is right for you.  Copyright   Copyright © 2021 [employer] and its affiliates and/or licensors. All rights reserved.  Patient Education       Obesity Discharge Instructions, Adult   About this topic   Obesity is a health problem where your weight is higher than it should be. Doctors use a method called body mass index or BMI to measure whether you are at a healthy weight for your height. The doctor uses your weight and your height to  determine your BMI. A high BMI can be an indicator of high body fat. Obesity is different from being overweight. Being overweight means your BMI is 25 or higher. Being obese means your BMI is 30 or higher. If your BMI is 40 or higher, you are considered severely or morbidly obese. Being obese may lead to many health problems. It may make it hard for you to breathe and move easily. It may raise your risk for illnesses like high blood sugar and heart disease.  What care is needed at home?   Ask your doctor what you need to do when you go home. Make sure you understand everything the doctor says. This way you will know what you need to do.  Change your diet. Lower the calories in your diet. Ask your dietitian to help you set an eating plan that is right for you.  Get enough exercise. Talk to your doctor about the right amount of exercise for you.  Do not smoke.  Limit the amount of beer, wine, and mixed drinks (alcohol) you drink.  Keep a record of your weight. Write down what you eat and drink each day. This may help you be more aware of the choices you are making.  What drugs may be needed?   The doctor may order drugs to:  Treat health problems that may cause weight gain  Lower your appetite  Help you lose weight  Will physical activity be limited?   Talk to your doctor about the right amount of exercise for you. This is especially important if you have heart problems, other illnesses, or if you recently had surgery.  Start slowly by doing more everyday activities like yard work or household chores.  Choose activities that you like to do.  What changes to diet are needed?   Whole grains are a good source of carbohydrates and fiber. Try to eat 3 to 5 servings of whole grain, high fiber foods each day. These are things like whole grain bread, bran cereals, brown rice, and whole wheat pasta.  Fruits and vegetables are good sources of fiber, vitamins, and minerals. Try to pick many kinds and colors. This will help you  get different nutrients in your diet. Choose fresh, frozen, or canned fruits and vegetables. Buy plain, frozen fruits without added sugar. Buy plain, frozen vegetables without added salt and fat. Buy canned fruit in 100% juice or water. Avoid canned fruit in syrups. Buy canned vegetables with no salt added.  Milk is a good source of protein and some vitamins and minerals. Choose low-fat (1%) or fat-free milk. Eat nonfat or low-fat cheeses, ice creams, yogurt, and other dairy products.  Meats and beans are good sources of protein and iron. Eat more low-fat or lean meats like chicken without the skin, turkey without the skin, and fish. Eggs and peanut butter are good sources of protein as well. Dried peas, beans, and lentils are also good and contain fiber. Fatty fish, like salmon, tuna, mackerel, herring, and trout, are good to eat and have healthy omega-3 fats.  Good fats can give you long-term energy. These are found in fish, nuts, seeds, and avocados. Try using olive oil, safflower oil, and low-sodium, low-fat salad dressing as a topping on foods. Use canola, olive, or peanut oil for cooking. Other healthy oils include corn, sunflower, and soybean oils.  Limit sweets such as candy and sugary drinks. Try to drink water instead. Drink diet or no calorie beverages when you want something other than water.  Cut back on solid fats, like butter, lard, and coconut oil.  Limit fatty foods such as desserts, fried foods, and chips.  Trans fats should be avoided. Most trans fats are found in processed foods, commercially baked goods, and fried foods and are very unhealthy. Saturated fat, which is different from trans fat, should be watched and limited if portions are too large. Saturated fat is found mainly in animal sources, such as meat and dairy products. Saturated fat is also found in coconut and palm oils.  Limit processed meats and most processed foods.  Limit eating out. If you choose to visit a restaurant, ask for  the nutritional facts. You may also be able to find nutritional facts online. Then, you can make a plan and choose healthy items. Watch the portion size. Have large portions split and take part home for some other meal.  What problems could happen?   Low mood or self-esteem  Anxiety  Muscle pain, joint pain, or arthritis  Sleep apnea  Diabetes  High blood pressure  High cholesterol  Heart, lung, or breathing problems  Kidney or liver problems  Cancer  Gallstones  Increased sweating  Reduced fertility  Pregnancy complications  Gastroesophageal reflux disease  When do I need to call the doctor?   Signs of high or low blood sugar  Thoughts of hurting yourself  Teach Back: Helping You Understand   The Teach Back Method helps you understand the information we are giving you. After you talk with the staff, tell them in your own words what you learned. This helps to make sure the staff has described each thing clearly. It also helps to explain things that may have been confusing. Before going home, make sure you can do these:  I can tell you about my condition.  I can tell you what changes I need to make with my diet or drugs.  I can tell you what I will do if I have signs of a heart attack or stroke.  Where can I learn more?   Centers for Disease Control and Prevention  http://www.cdc.gov/obesity/adult/defining.html   NHS  http://www.nhs.uk/Conditions/Obesity/Pages/obesityprevention.aspx   Last Reviewed Date   2021-06-23  Consumer Information Use and Disclaimer   This information is not specific medical advice and does not replace information you receive from your health care provider. This is only a brief summary of general information. It does NOT include all information about conditions, illnesses, injuries, tests, procedures, treatments, therapies, discharge instructions or life-style choices that may apply to you. You must talk with your health care provider for complete information about your health and treatment  options. This information should not be used to decide whether or not to accept your health care providers advice, instructions or recommendations. Only your health care provider has the knowledge and training to provide advice that is right for you.  Copyright   Copyright © 2021 [employer]. and its affiliates and/or licensors. All rights reserved.  Patient Education       High Blood Pressure Discharge Instructions   About this topic   The medical name for high blood pressure is hypertension. Your blood pressure is measured with 2 numbers. For example, you may hear the staff say your blood pressure is 130 over 80. High blood pressure cannot be cured. You must control it with drugs and lifestyle changes. High blood pressure puts you at risk for heart attack, stroke, and kidney disease.         What care is needed at home?   Ask your doctor what you need to do when you go home. Make sure you ask questions if you do not understand what the doctor says. This way you will know what you need to do.  Take all your medicines as ordered. Do not stop taking any of your regular medicines without talking to your doctor.  Learn how to check your blood pressure at home, if your doctor suggests you do this.  What follow-up care is needed?   Your doctor may ask you to make visits to the office to check on your progress. Be sure to keep these visits.  What drugs may be needed?   The doctor may order drugs to help control your high blood pressure. Take your drugs as ordered. Do not take other prescription drugs or over-the-counter (OTC) drugs without talking to your doctor first.  Will physical activity be limited?   Talk to your doctor about the right amount of activity for you. Exercise may help you lose weight and lower your blood pressure.  What changes to diet are needed?   Do not use salt on your food. Use herbs and spices to improve the taste.  Eat less than 1,500 mg of sodium a day. Read food labels to see how much sodium  is in a food.  Limit coffee, tea, and soda to 2 cups (480 mL) a day.  Limit beer, wine, and mixed drinks (alcohol) to 1 drink a day for women and 2 drinks a day for men.  Eat lots of fruits, vegetables, and low-fat dairy products.  Avoid fatty foods like fried foods or chips.  Talk with your dietitian about the diet changes you need and the number of calories you should eat each day.  What problems could happen?   Heart attack, heart failure, or other heart problems  Stroke  Swelling of blood vessels  Kidney failure  Loss of eyesight  Memory problems  Fluid in the lungs  Death  What can be done to prevent this health problem?   Exercise regularly. Try to get at least 30 minutes of exercise most days of the week.  Avoid weight gain.  Stop smoking. Your doctor can tell you about stop smoking programs.  Learn to lower stress.  When do I need to call the doctor?   You have signs of a heart attack, which may include:  Severe chest pain, pressure, or discomfort with:  Breathing trouble, sweating, upset stomach, or cold, clammy skin  Pain in your arms, back, or jaw  Worse pain with activity like walking up stairs  Fast or irregular heartbeat  Feeling dizzy, faint, or weak  You have signs of stroke like sudden:  Numbness or weakness of the face, arm, or leg, especially on one side of the body  Confusion, trouble speaking, or understanding  Trouble seeing in one or both eyes  Trouble walking, dizziness, loss of balance, or coordination  Severe headache with no known cause  You have a seizure or pass out.  You have a severe headache with an upset stomach or throwing up.  You have sudden, severe back pain.  You have 2 home blood pressure readings higher than 180/120.  Your urine is brown or bloody.  Teach Back: Helping You Understand   The Teach Back Method helps you understand the information we are giving you. After you talk with the staff, tell them in your own words what you learned. This helps to make sure the staff has  described each thing clearly. It also helps to explain things that may have been confusing. Before going home, make sure you can do these:  I can tell you about my condition.  I can tell you what numbers are too high for my blood pressure.  I can tell you what I will do if I have signs of a heart attack or stroke.  Where can I learn more?   American Academy of Family Physicians  https://familydoctor.org/condition/high-blood-pressure/   American Academy of Family Physicians  https://familydoctor.org/the-dash-diet-healthy-eating-to-control-your-blood-pressure/   American Heart Association  http://www.heart.org/HEARTORG/Conditions/HighBloodPressure/AboutHighBloodPressure/About-High-Blood-Pressure_UC_002050_Article.jsp#.K6eD8HqqK1q   NHS Choices  http://www.nhs.uk/conditions/blood-pressure-(high)/pages/introduction.aspx   [employer]  https://www.uptodate.com/contents/high-blood-pressure-in-adults-beyond-the-basics?source=see_link   Last Reviewed Date   2021-06-08  Consumer Information Use and Disclaimer   This information is not specific medical advice and does not replace information you receive from your health care provider. This is only a brief summary of general information. It does NOT include all information about conditions, illnesses, injuries, tests, procedures, treatments, therapies, discharge instructions or life-style choices that may apply to you. You must talk with your health care provider for complete information about your health and treatment options. This information should not be used to decide whether or not to accept your health care providers advice, instructions or recommendations. Only your health care provider has the knowledge and training to provide advice that is right for you.  Copyright   Copyright © 2021 Up[employer]Date, Inc. and its affiliates and/or licensors. All rights reserved.  Patient Education       DASH Diet   About this topic   DASH stands for Dietary Approaches to Stop Hypertension.  The DASH diet may help you lower blood pressure. It may also help keep you from getting high blood pressure. You will eat less fat and more fiber on the DASH diet.  This diet gives you more minerals that fight high blood pressure. Some nutrients in this diet are:  Potassium ? Acts to help you get rid of salt. This may help to lower blood pressure.  Calcium ? Makes blood vessels and muscles work the right way  Magnesium - Helps blood vessels relax  Fiber ? Helps you feel full. It also helps digestion.  What will the results be?   The DASH diet may help you:  Lower your blood pressure and cholesterol  Lower your risk for cancer, heart disease, heart attack, and stroke. It may also lower your risk for heart failure, kidney stones, and diabetes.  Lose weight or keep a healthy weight  What lifestyle changes are needed?   Add regular exercise to get the most help from this diet.  Try to lower stress. Find ways to relax.  Stop smoking. Avoid secondhand smoke.  Limit alcohol intake.  What changes to diet are needed?   Know about poor eating habits. Then, you can fix them as you work with the program.  This diet encourages fruits and vegetables, whole grains, lean meats, healthy fats, and low-fat or fat-free dairy products.  This diet is lower in saturated fats, trans-fats, cholesterol, added sugars, and sodium.  Who should use this diet?   This eating plan is good for the whole family. It is also good for people with high blood pressure and those at risk for high blood pressure.  What foods are good to eat?   Grains: Try to eat 6 to 8 servings of whole grain, high fiber foods each day. These are bread, cereals, brown rice, or pasta.  Fruits and vegetables: Eat 4 to 5 servings each day. Try to pick many kinds and colors. Fresh or frozen are best. Look for low sodium or salt-free if you choose canned.  Dairy: Try to eat 2 to 3 servings of fat free and low fat milk products each day.  Lean meats, poultry, and seafood: Try to  eat 6 servings or less of lean meats, poultry, and seafood each day. Try to choose more low fat or lean meats like chicken and turkey. Eat less red meat. Eat more fish instead.  Nuts, seeds, and legumes (dry beans and peas): Try to eat 4 to 5 servings each week. Try to pick nuts such as almonds and walnuts, sunflower seeds, peanut butter, soy beans, lentils, kidney beans, and split peas.  Fats and oils: Try to eat 2 to 3 servings of fats and oils each day. Eat good fats found in fish, nuts, and avocados. Try using olive oil or vegetable oils such as canola oil. Other good oils to try are corn, safflower, sunflower, or soybean oils. Use low-sodium and low-fat salad dressing and mayonnaise.  Condiments: Pepper, herbs, spices, vinegar, lemon or lime juices are great for seasoning. Be careful to choose low-sodium or salt-free products if you use broths, soups, or soy sauce.  Sweets: Try to eat less than 5 servings each week. Choose low-fat and trans fat-free desserts. These are things like fruit flavored gelatin, sorbet, jelly beans, chitra crackers, animal crackers, low-fat fig bars, and judith snaps. Eat fruit to satisfy your desire for sweets.     What foods should be limited or avoided?   Grains: Salted breads, rolls, crackers, quick breads, self-rising flours, biscuit mixes, regular bread crumbs, instant hot cereals, commercially-prepared rice, pasta, stuffing mixes  Fruits and vegetables: Commercially-prepared potatoes and vegetable mixes, regular canned vegetables and juices, vegetables frozen with sauce or pickled vegetables, processed fruits with salt or sodium  Milk: Whole milk, malted milk, chocolate milk, buttermilk, cheese, ice cream  Meats and beans: Smoked, cured, salted, or canned fish; meats or poultry such as hancock, sausages, sardines; high-fat cuts of meat like beef, lamb, or pork; chicken with the skin on it  Fats: Cut back on solid fats like butter, lard, and margarine. Eat less food with high  saturated fat, cholesterol and total fat.  Condiments and snacks: Salted and canned peas, beans, and olives; salted snack foods; fried foods; soda or other sweetened drinks  Sweets: High-fat baked goods such as muffins, donuts, pastries, commercial baked goods, candy bars  If you choose to drink alcohol, limit the amount you drink. Women should have 1 drink or less per day and men should have 2 drinks or less per day.  Helpful tips   Avoid eating canned vegetables and processed foods. These have a lot of salt in them. Look for a low-salt or low-sodium choice.  Try baking or broiling instead of frying food.  Write down the foods you eat. This will help you track what you have eaten each week.  When you go to a grocery store, have a list or a meal plan. Do not shop when you are hungry to avoid cravings for foods.  Read food labels with care. They will show you how much is in a serving. The amount is given as a percentage of the total amount you need each day. Reading labels will help you make healthy food choices.       Avoid fast foods.  Talk to your doctor or dietitian to see if you need vitamin and mineral supplements to help you balance your diet.  Talk to a dietitian for help.  Where can I learn more?   [employer]  https://www.eatright.org/health/wellness/heart-and-cardiovascular-health/dash-diet-reducing-hypertension-through-diet-and-lifestyle   [employer]org  http://familydoctor.org/familydoctor/en/prevention-wellness/food-nutrition/weight-loss/the-dash-diet-healthy-eating-to-control-your-blood-pressure.html   Last Reviewed Date   2021-03-15  Consumer Information Use and Disclaimer   This information is not specific medical advice and does not replace information you receive from your health care provider. This is only a brief summary of general information. It does NOT include all information about conditions, illnesses, injuries, tests, procedures, treatments, therapies, discharge  instructions or life-style choices that may apply to you. You must talk with your health care provider for complete information about your health and treatment options. This information should not be used to decide whether or not to accept your health care providers advice, instructions or recommendations. Only your health care provider has the knowledge and training to provide advice that is right for you.  Copyright   Copyright © 2021 [employer] and its affiliates and/or licensors. All rights reserved.

## 2024-11-13 DIAGNOSIS — I10 ESSENTIAL HYPERTENSION: ICD-10-CM

## 2024-11-13 RX ORDER — NADOLOL 20 MG/1
20 TABLET ORAL DAILY
Qty: 90 TABLET | Refills: 3 | Status: SHIPPED | OUTPATIENT
Start: 2024-11-13

## 2025-02-07 ENCOUNTER — PATIENT OUTREACH (OUTPATIENT)
Dept: ADMINISTRATIVE | Facility: HOSPITAL | Age: 50
End: 2025-02-07
Payer: COMMERCIAL